# Patient Record
Sex: MALE | Race: WHITE | HISPANIC OR LATINO | Employment: FULL TIME | URBAN - METROPOLITAN AREA
[De-identification: names, ages, dates, MRNs, and addresses within clinical notes are randomized per-mention and may not be internally consistent; named-entity substitution may affect disease eponyms.]

---

## 2023-12-26 ENCOUNTER — ANESTHESIA EVENT (OUTPATIENT)
Dept: SURGERY | Facility: MEDICAL CENTER | Age: 53
DRG: 853 | End: 2023-12-26

## 2023-12-26 ENCOUNTER — ANESTHESIA (OUTPATIENT)
Dept: SURGERY | Facility: MEDICAL CENTER | Age: 53
DRG: 853 | End: 2023-12-26

## 2023-12-26 ENCOUNTER — APPOINTMENT (OUTPATIENT)
Dept: RADIOLOGY | Facility: MEDICAL CENTER | Age: 53
DRG: 853 | End: 2023-12-26
Attending: INTERNAL MEDICINE

## 2023-12-26 ENCOUNTER — HOSPITAL ENCOUNTER (INPATIENT)
Facility: MEDICAL CENTER | Age: 53
LOS: 4 days | DRG: 853 | End: 2023-12-30
Attending: STUDENT IN AN ORGANIZED HEALTH CARE EDUCATION/TRAINING PROGRAM | Admitting: STUDENT IN AN ORGANIZED HEALTH CARE EDUCATION/TRAINING PROGRAM

## 2023-12-26 DIAGNOSIS — K83.09 CHOLANGITIS: ICD-10-CM

## 2023-12-26 DIAGNOSIS — K85.10 ACUTE BILIARY PANCREATITIS WITHOUT INFECTION OR NECROSIS: ICD-10-CM

## 2023-12-26 DIAGNOSIS — K80.43 CHOLEDOCHOLITHIASIS WITH ACUTE CHOLECYSTITIS WITH OBSTRUCTION: ICD-10-CM

## 2023-12-26 PROBLEM — D72.828 NEUTROPHILIC LEUKOCYTOSIS: Status: ACTIVE | Noted: 2023-12-26

## 2023-12-26 PROBLEM — E87.6 HYPOKALEMIA: Status: ACTIVE | Noted: 2023-12-26

## 2023-12-26 PROBLEM — R65.20 SEPSIS WITH ACUTE LIVER FAILURE WITHOUT HEPATIC COMA OR SEPTIC SHOCK (HCC): Status: ACTIVE | Noted: 2023-12-26

## 2023-12-26 PROBLEM — E66.811 CLASS 1 OBESITY DUE TO EXCESS CALORIES WITH SERIOUS COMORBIDITY AND BODY MASS INDEX (BMI) OF 31.0 TO 31.9 IN ADULT: Status: ACTIVE | Noted: 2023-12-26

## 2023-12-26 PROBLEM — E66.09 CLASS 1 OBESITY DUE TO EXCESS CALORIES WITH SERIOUS COMORBIDITY AND BODY MASS INDEX (BMI) OF 31.0 TO 31.9 IN ADULT: Status: ACTIVE | Noted: 2023-12-26

## 2023-12-26 PROBLEM — R79.89 ELEVATED LFTS: Status: ACTIVE | Noted: 2023-12-26

## 2023-12-26 PROBLEM — A41.9 SEPSIS (HCC): Status: ACTIVE | Noted: 2023-12-26

## 2023-12-26 PROBLEM — D72.9 NEUTROPHILIC LEUKOCYTOSIS: Status: ACTIVE | Noted: 2023-12-26

## 2023-12-26 PROBLEM — K72.00 SEPSIS WITH ACUTE LIVER FAILURE WITHOUT HEPATIC COMA OR SEPTIC SHOCK (HCC): Status: ACTIVE | Noted: 2023-12-26

## 2023-12-26 LAB
ALBUMIN SERPL BCP-MCNC: 2.8 G/DL (ref 3.2–4.9)
ALBUMIN/GLOB SERPL: 0.7 G/DL
ALP SERPL-CCNC: 466 U/L (ref 30–99)
ALT SERPL-CCNC: 142 U/L (ref 2–50)
ANION GAP SERPL CALC-SCNC: 12 MMOL/L (ref 7–16)
AST SERPL-CCNC: 87 U/L (ref 12–45)
BASOPHILS # BLD AUTO: 0.3 % (ref 0–1.8)
BASOPHILS # BLD: 0.08 K/UL (ref 0–0.12)
BILIRUB SERPL-MCNC: 6.1 MG/DL (ref 0.1–1.5)
BUN SERPL-MCNC: 12 MG/DL (ref 8–22)
CALCIUM ALBUM COR SERPL-MCNC: 9 MG/DL (ref 8.5–10.5)
CALCIUM SERPL-MCNC: 8 MG/DL (ref 8.5–10.5)
CFT BLD TEG: 6.8 MIN (ref 4.6–9.1)
CFT P HPASE BLD TEG: 6.8 MIN (ref 4.3–8.3)
CHLORIDE SERPL-SCNC: 103 MMOL/L (ref 96–112)
CLOT ANGLE BLD TEG: 80.8 DEGREES (ref 63–78)
CO2 SERPL-SCNC: 20 MMOL/L (ref 20–33)
CREAT SERPL-MCNC: 0.63 MG/DL (ref 0.5–1.4)
CT.EXTRINSIC BLD ROTEM: 0.8 MIN (ref 0.8–2.1)
EOSINOPHIL # BLD AUTO: 0.03 K/UL (ref 0–0.51)
EOSINOPHIL NFR BLD: 0.1 % (ref 0–6.9)
ERYTHROCYTE [DISTWIDTH] IN BLOOD BY AUTOMATED COUNT: 43.8 FL (ref 35.9–50)
GFR SERPLBLD CREATININE-BSD FMLA CKD-EPI: 114 ML/MIN/1.73 M 2
GLOBULIN SER CALC-MCNC: 3.9 G/DL (ref 1.9–3.5)
GLUCOSE SERPL-MCNC: 113 MG/DL (ref 65–99)
HCT VFR BLD AUTO: 35 % (ref 42–52)
HGB BLD-MCNC: 11.5 G/DL (ref 14–18)
IMM GRANULOCYTES # BLD AUTO: 0.2 K/UL (ref 0–0.11)
IMM GRANULOCYTES NFR BLD AUTO: 0.7 % (ref 0–0.9)
INR PPP: 1.2 (ref 0.87–1.13)
LACTATE SERPL-SCNC: 0.8 MMOL/L (ref 0.5–2)
LYMPHOCYTES # BLD AUTO: 1.7 K/UL (ref 1–4.8)
LYMPHOCYTES NFR BLD: 6.3 % (ref 22–41)
MAGNESIUM SERPL-MCNC: 1.9 MG/DL (ref 1.5–2.5)
MCF BLD TEG: 69 MM (ref 52–69)
MCF.PLATELET INHIB BLD ROTEM: 51.3 MM (ref 15–32)
MCH RBC QN AUTO: 28 PG (ref 27–33)
MCHC RBC AUTO-ENTMCNC: 32.9 G/DL (ref 32.3–36.5)
MCV RBC AUTO: 85.4 FL (ref 81.4–97.8)
MONOCYTES # BLD AUTO: 1.97 K/UL (ref 0–0.85)
MONOCYTES NFR BLD AUTO: 7.3 % (ref 0–13.4)
NEUTROPHILS # BLD AUTO: 23.03 K/UL (ref 1.82–7.42)
NEUTROPHILS NFR BLD: 85.3 % (ref 44–72)
NRBC # BLD AUTO: 0 K/UL
NRBC BLD-RTO: 0 /100 WBC (ref 0–0.2)
PA AA BLD-ACNC: ABNORMAL % (ref 0–11)
PA ADP BLD-ACNC: ABNORMAL % (ref 0–17)
PLATELET # BLD AUTO: 316 K/UL (ref 164–446)
PMV BLD AUTO: 9.5 FL (ref 9–12.9)
POTASSIUM SERPL-SCNC: 3.4 MMOL/L (ref 3.6–5.5)
PROCALCITONIN SERPL-MCNC: 4.2 NG/ML
PROT SERPL-MCNC: 6.7 G/DL (ref 6–8.2)
PROTHROMBIN TIME: 15.4 SEC (ref 12–14.6)
RBC # BLD AUTO: 4.1 M/UL (ref 4.7–6.1)
SODIUM SERPL-SCNC: 135 MMOL/L (ref 135–145)
TEG ALGORITHM TGALG: ABNORMAL
WBC # BLD AUTO: 27 K/UL (ref 4.8–10.8)

## 2023-12-26 PROCEDURE — 700101 HCHG RX REV CODE 250: Performed by: ANESTHESIOLOGY

## 2023-12-26 PROCEDURE — 160029 HCHG SURGERY MINUTES - 1ST 30 MINS LEVEL 4: Performed by: INTERNAL MEDICINE

## 2023-12-26 PROCEDURE — 85025 COMPLETE CBC W/AUTO DIFF WBC: CPT

## 2023-12-26 PROCEDURE — 700105 HCHG RX REV CODE 258: Performed by: STUDENT IN AN ORGANIZED HEALTH CARE EDUCATION/TRAINING PROGRAM

## 2023-12-26 PROCEDURE — 84145 PROCALCITONIN (PCT): CPT

## 2023-12-26 PROCEDURE — 74328 X-RAY BILE DUCT ENDOSCOPY: CPT

## 2023-12-26 PROCEDURE — 160048 HCHG OR STATISTICAL LEVEL 1-5: Performed by: INTERNAL MEDICINE

## 2023-12-26 PROCEDURE — 700111 HCHG RX REV CODE 636 W/ 250 OVERRIDE (IP): Mod: JZ | Performed by: STUDENT IN AN ORGANIZED HEALTH CARE EDUCATION/TRAINING PROGRAM

## 2023-12-26 PROCEDURE — 83605 ASSAY OF LACTIC ACID: CPT

## 2023-12-26 PROCEDURE — 43274 ERCP DUCT STENT PLACEMENT: CPT | Performed by: INTERNAL MEDICINE

## 2023-12-26 PROCEDURE — 85610 PROTHROMBIN TIME: CPT

## 2023-12-26 PROCEDURE — 85347 COAGULATION TIME ACTIVATED: CPT

## 2023-12-26 PROCEDURE — 700102 HCHG RX REV CODE 250 W/ 637 OVERRIDE(OP): Performed by: STUDENT IN AN ORGANIZED HEALTH CARE EDUCATION/TRAINING PROGRAM

## 2023-12-26 PROCEDURE — C2617 STENT, NON-COR, TEM W/O DEL: HCPCS | Performed by: INTERNAL MEDICINE

## 2023-12-26 PROCEDURE — 99255 IP/OBS CONSLTJ NEW/EST HI 80: CPT | Mod: 25 | Performed by: INTERNAL MEDICINE

## 2023-12-26 PROCEDURE — A9270 NON-COVERED ITEM OR SERVICE: HCPCS | Mod: JZ

## 2023-12-26 PROCEDURE — 770020 HCHG ROOM/CARE - TELE (206)

## 2023-12-26 PROCEDURE — 99223 1ST HOSP IP/OBS HIGH 75: CPT | Mod: AI | Performed by: STUDENT IN AN ORGANIZED HEALTH CARE EDUCATION/TRAINING PROGRAM

## 2023-12-26 PROCEDURE — 700111 HCHG RX REV CODE 636 W/ 250 OVERRIDE (IP): Performed by: ANESTHESIOLOGY

## 2023-12-26 PROCEDURE — C1769 GUIDE WIRE: HCPCS | Performed by: INTERNAL MEDICINE

## 2023-12-26 PROCEDURE — 0F798DZ DILATION OF COMMON BILE DUCT WITH INTRALUMINAL DEVICE, VIA NATURAL OR ARTIFICIAL OPENING ENDOSCOPIC: ICD-10-PCS | Performed by: INTERNAL MEDICINE

## 2023-12-26 PROCEDURE — 43264 ERCP REMOVE DUCT CALCULI: CPT | Performed by: INTERNAL MEDICINE

## 2023-12-26 PROCEDURE — 700117 HCHG RX CONTRAST REV CODE 255: Performed by: INTERNAL MEDICINE

## 2023-12-26 PROCEDURE — 85576 BLOOD PLATELET AGGREGATION: CPT

## 2023-12-26 PROCEDURE — A9270 NON-COVERED ITEM OR SERVICE: HCPCS | Performed by: STUDENT IN AN ORGANIZED HEALTH CARE EDUCATION/TRAINING PROGRAM

## 2023-12-26 PROCEDURE — 160035 HCHG PACU - 1ST 60 MINS PHASE I: Performed by: INTERNAL MEDICINE

## 2023-12-26 PROCEDURE — 700102 HCHG RX REV CODE 250 W/ 637 OVERRIDE(OP): Mod: JZ

## 2023-12-26 PROCEDURE — 85384 FIBRINOGEN ACTIVITY: CPT | Mod: 91

## 2023-12-26 PROCEDURE — 0FC98ZZ EXTIRPATION OF MATTER FROM COMMON BILE DUCT, VIA NATURAL OR ARTIFICIAL OPENING ENDOSCOPIC: ICD-10-PCS | Performed by: INTERNAL MEDICINE

## 2023-12-26 PROCEDURE — 80053 COMPREHEN METABOLIC PANEL: CPT

## 2023-12-26 PROCEDURE — 160009 HCHG ANES TIME/MIN: Performed by: INTERNAL MEDICINE

## 2023-12-26 PROCEDURE — 36415 COLL VENOUS BLD VENIPUNCTURE: CPT

## 2023-12-26 PROCEDURE — 83735 ASSAY OF MAGNESIUM: CPT

## 2023-12-26 PROCEDURE — 160002 HCHG RECOVERY MINUTES (STAT): Performed by: INTERNAL MEDICINE

## 2023-12-26 DEVICE — ADVANIX BILIARY CENTER BEND 10X5: Type: IMPLANTABLE DEVICE | Site: BILE DUCT | Status: FUNCTIONAL

## 2023-12-26 RX ORDER — DEXAMETHASONE SODIUM PHOSPHATE 4 MG/ML
INJECTION, SOLUTION INTRA-ARTICULAR; INTRALESIONAL; INTRAMUSCULAR; INTRAVENOUS; SOFT TISSUE PRN
Status: DISCONTINUED | OUTPATIENT
Start: 2023-12-26 | End: 2023-12-26 | Stop reason: SURG

## 2023-12-26 RX ORDER — MORPHINE SULFATE 4 MG/ML
2 INJECTION INTRAVENOUS EVERY 4 HOURS PRN
Status: DISCONTINUED | OUTPATIENT
Start: 2023-12-26 | End: 2023-12-28

## 2023-12-26 RX ORDER — LABETALOL HYDROCHLORIDE 5 MG/ML
10 INJECTION, SOLUTION INTRAVENOUS EVERY 4 HOURS PRN
Status: DISCONTINUED | OUTPATIENT
Start: 2023-12-26 | End: 2023-12-30 | Stop reason: HOSPADM

## 2023-12-26 RX ORDER — LIDOCAINE HYDROCHLORIDE 20 MG/ML
INJECTION, SOLUTION EPIDURAL; INFILTRATION; INTRACAUDAL; PERINEURAL PRN
Status: DISCONTINUED | OUTPATIENT
Start: 2023-12-26 | End: 2023-12-26 | Stop reason: SURG

## 2023-12-26 RX ORDER — POTASSIUM CHLORIDE 20 MEQ/1
40 TABLET, EXTENDED RELEASE ORAL ONCE
Status: COMPLETED | OUTPATIENT
Start: 2023-12-26 | End: 2023-12-26

## 2023-12-26 RX ORDER — ROCURONIUM BROMIDE 10 MG/ML
INJECTION, SOLUTION INTRAVENOUS PRN
Status: DISCONTINUED | OUTPATIENT
Start: 2023-12-26 | End: 2023-12-26 | Stop reason: SURG

## 2023-12-26 RX ORDER — OXYCODONE HCL 5 MG/5 ML
10 SOLUTION, ORAL ORAL
Status: DISCONTINUED | OUTPATIENT
Start: 2023-12-26 | End: 2023-12-26 | Stop reason: HOSPADM

## 2023-12-26 RX ORDER — ONDANSETRON 2 MG/ML
4 INJECTION INTRAMUSCULAR; INTRAVENOUS
Status: DISCONTINUED | OUTPATIENT
Start: 2023-12-26 | End: 2023-12-26 | Stop reason: HOSPADM

## 2023-12-26 RX ORDER — EPHEDRINE SULFATE 50 MG/ML
5 INJECTION, SOLUTION INTRAVENOUS
Status: DISCONTINUED | OUTPATIENT
Start: 2023-12-26 | End: 2023-12-26 | Stop reason: HOSPADM

## 2023-12-26 RX ORDER — LABETALOL HYDROCHLORIDE 5 MG/ML
5 INJECTION, SOLUTION INTRAVENOUS
Status: DISCONTINUED | OUTPATIENT
Start: 2023-12-26 | End: 2023-12-26 | Stop reason: HOSPADM

## 2023-12-26 RX ORDER — TRAMADOL HYDROCHLORIDE 50 MG/1
50 TABLET ORAL EVERY 4 HOURS PRN
Status: DISCONTINUED | OUTPATIENT
Start: 2023-12-26 | End: 2023-12-28

## 2023-12-26 RX ORDER — SODIUM CHLORIDE, SODIUM LACTATE, POTASSIUM CHLORIDE, CALCIUM CHLORIDE 600; 310; 30; 20 MG/100ML; MG/100ML; MG/100ML; MG/100ML
INJECTION, SOLUTION INTRAVENOUS CONTINUOUS
Status: DISCONTINUED | OUTPATIENT
Start: 2023-12-26 | End: 2023-12-26 | Stop reason: HOSPADM

## 2023-12-26 RX ORDER — BISACODYL 10 MG
10 SUPPOSITORY, RECTAL RECTAL
Status: DISCONTINUED | OUTPATIENT
Start: 2023-12-26 | End: 2023-12-30 | Stop reason: HOSPADM

## 2023-12-26 RX ORDER — SODIUM CHLORIDE, SODIUM LACTATE, POTASSIUM CHLORIDE, CALCIUM CHLORIDE 600; 310; 30; 20 MG/100ML; MG/100ML; MG/100ML; MG/100ML
INJECTION, SOLUTION INTRAVENOUS CONTINUOUS
Status: DISCONTINUED | OUTPATIENT
Start: 2023-12-26 | End: 2023-12-28

## 2023-12-26 RX ORDER — DIPHENHYDRAMINE HYDROCHLORIDE 50 MG/ML
12.5 INJECTION INTRAMUSCULAR; INTRAVENOUS
Status: DISCONTINUED | OUTPATIENT
Start: 2023-12-26 | End: 2023-12-26 | Stop reason: HOSPADM

## 2023-12-26 RX ORDER — ONDANSETRON 2 MG/ML
INJECTION INTRAMUSCULAR; INTRAVENOUS PRN
Status: DISCONTINUED | OUTPATIENT
Start: 2023-12-26 | End: 2023-12-26 | Stop reason: SURG

## 2023-12-26 RX ORDER — AMOXICILLIN 250 MG
2 CAPSULE ORAL 2 TIMES DAILY
Status: DISCONTINUED | OUTPATIENT
Start: 2023-12-26 | End: 2023-12-30 | Stop reason: HOSPADM

## 2023-12-26 RX ORDER — HALOPERIDOL 5 MG/ML
1 INJECTION INTRAMUSCULAR
Status: DISCONTINUED | OUTPATIENT
Start: 2023-12-26 | End: 2023-12-26 | Stop reason: HOSPADM

## 2023-12-26 RX ORDER — OXYCODONE HCL 5 MG/5 ML
5 SOLUTION, ORAL ORAL
Status: DISCONTINUED | OUTPATIENT
Start: 2023-12-26 | End: 2023-12-26 | Stop reason: HOSPADM

## 2023-12-26 RX ORDER — POLYETHYLENE GLYCOL 3350 17 G/17G
1 POWDER, FOR SOLUTION ORAL
Status: DISCONTINUED | OUTPATIENT
Start: 2023-12-26 | End: 2023-12-30 | Stop reason: HOSPADM

## 2023-12-26 RX ADMIN — DEXAMETHASONE SODIUM PHOSPHATE 8 MG: 4 INJECTION INTRA-ARTICULAR; INTRALESIONAL; INTRAMUSCULAR; INTRAVENOUS; SOFT TISSUE at 11:26

## 2023-12-26 RX ADMIN — ONDANSETRON 4 MG: 2 INJECTION INTRAMUSCULAR; INTRAVENOUS at 11:26

## 2023-12-26 RX ADMIN — PIPERACILLIN AND TAZOBACTAM 3.38 G: 3; .375 INJECTION, POWDER, FOR SOLUTION INTRAVENOUS at 22:56

## 2023-12-26 RX ADMIN — SODIUM CHLORIDE, POTASSIUM CHLORIDE, SODIUM LACTATE AND CALCIUM CHLORIDE: 600; 310; 30; 20 INJECTION, SOLUTION INTRAVENOUS at 11:22

## 2023-12-26 RX ADMIN — ROCURONIUM BROMIDE 50 MG: 50 INJECTION, SOLUTION INTRAVENOUS at 11:26

## 2023-12-26 RX ADMIN — SODIUM CHLORIDE, POTASSIUM CHLORIDE, SODIUM LACTATE AND CALCIUM CHLORIDE: 600; 310; 30; 20 INJECTION, SOLUTION INTRAVENOUS at 23:52

## 2023-12-26 RX ADMIN — MORPHINE SULFATE 2 MG: 4 INJECTION, SOLUTION INTRAMUSCULAR; INTRAVENOUS at 05:11

## 2023-12-26 RX ADMIN — LIDOCAINE HYDROCHLORIDE 100 MG: 20 INJECTION, SOLUTION EPIDURAL; INFILTRATION; INTRACAUDAL at 11:26

## 2023-12-26 RX ADMIN — SODIUM CHLORIDE, POTASSIUM CHLORIDE, SODIUM LACTATE AND CALCIUM CHLORIDE: 600; 310; 30; 20 INJECTION, SOLUTION INTRAVENOUS at 05:00

## 2023-12-26 RX ADMIN — POTASSIUM CHLORIDE 40 MEQ: 1500 TABLET, EXTENDED RELEASE ORAL at 08:16

## 2023-12-26 RX ADMIN — DOCUSATE SODIUM 50 MG AND SENNOSIDES 8.6 MG 2 TABLET: 8.6; 5 TABLET, FILM COATED ORAL at 05:08

## 2023-12-26 RX ADMIN — PROPOFOL 100 MG: 10 INJECTION, EMULSION INTRAVENOUS at 11:26

## 2023-12-26 RX ADMIN — PIPERACILLIN AND TAZOBACTAM 3.38 G: 3; .375 INJECTION, POWDER, FOR SOLUTION INTRAVENOUS at 08:17

## 2023-12-26 RX ADMIN — SUGAMMADEX 200 MG: 100 INJECTION, SOLUTION INTRAVENOUS at 11:45

## 2023-12-26 RX ADMIN — MORPHINE SULFATE 2 MG: 4 INJECTION, SOLUTION INTRAMUSCULAR; INTRAVENOUS at 22:51

## 2023-12-26 ASSESSMENT — COGNITIVE AND FUNCTIONAL STATUS - GENERAL
SUGGESTED CMS G CODE MODIFIER DAILY ACTIVITY: CH
DAILY ACTIVITIY SCORE: 24
SUGGESTED CMS G CODE MODIFIER MOBILITY: CH
MOBILITY SCORE: 24

## 2023-12-26 ASSESSMENT — ENCOUNTER SYMPTOMS
HEADACHES: 0
DEPRESSION: 0
COUGH: 0
VOMITING: 0
MYALGIAS: 0
SPUTUM PRODUCTION: 0
CHILLS: 0
DOUBLE VISION: 0
FEVER: 0
TINGLING: 0
DIARRHEA: 0
BLURRED VISION: 0
ORTHOPNEA: 0
WHEEZING: 0
NAUSEA: 0
ABDOMINAL PAIN: 1
PALPITATIONS: 0
SHORTNESS OF BREATH: 0

## 2023-12-26 ASSESSMENT — LIFESTYLE VARIABLES
AVERAGE NUMBER OF DAYS PER WEEK YOU HAVE A DRINK CONTAINING ALCOHOL: 0
TOTAL SCORE: 0
HAVE PEOPLE ANNOYED YOU BY CRITICIZING YOUR DRINKING: NO
EVER FELT BAD OR GUILTY ABOUT YOUR DRINKING: NO
EVER HAD A DRINK FIRST THING IN THE MORNING TO STEADY YOUR NERVES TO GET RID OF A HANGOVER: NO
HOW MANY TIMES IN THE PAST YEAR HAVE YOU HAD 5 OR MORE DRINKS IN A DAY: 0
TOTAL SCORE: 0
ALCOHOL_USE: NO
ON A TYPICAL DAY WHEN YOU DRINK ALCOHOL HOW MANY DRINKS DO YOU HAVE: 0
DOES PATIENT WANT TO STOP DRINKING: NO
HAVE YOU EVER FELT YOU SHOULD CUT DOWN ON YOUR DRINKING: NO
TOTAL SCORE: 0
CONSUMPTION TOTAL: NEGATIVE

## 2023-12-26 ASSESSMENT — PAIN SCALES - GENERAL: PAIN_LEVEL: 2

## 2023-12-26 ASSESSMENT — PATIENT HEALTH QUESTIONNAIRE - PHQ9
SUM OF ALL RESPONSES TO PHQ9 QUESTIONS 1 AND 2: 0
2. FEELING DOWN, DEPRESSED, IRRITABLE, OR HOPELESS: NOT AT ALL
1. LITTLE INTEREST OR PLEASURE IN DOING THINGS: NOT AT ALL

## 2023-12-26 ASSESSMENT — PAIN DESCRIPTION - PAIN TYPE
TYPE: ACUTE PAIN
TYPE: ACUTE PAIN
TYPE: SURGICAL PAIN
TYPE: ACUTE PAIN
TYPE: ACUTE PAIN

## 2023-12-26 ASSESSMENT — FIBROSIS 4 INDEX: FIB4 SCORE: 1.2

## 2023-12-26 NOTE — H&P
Surgery General History & Physical Note      Date  12/26/2023    Primary Care Physician  No primary care provider on file.    CC  Choledocholithiasis    HPI  This is a 53 y.o. male who presented with obstructive jaundice.  He apparently was transferred from [facility].  He is currently postoperative ERCP.  He was found to have choledocholithiasis without evidence described of cholangitis.  His bilirubin was elevated along with his liver function test.  His white count is 27,000.  Patient is unable to provide any historical information.  Patient he does not demonstrate an address.  He allegedly was admitted there yesterday and transferred here laboratories demonstrated a marked elevation of his lipase which was not repeated here.  GI was consulted.  Pro time and thromboelastogram were not obtained.  Patient underwent ERCP based on radiologic imaging and stones were recovered.  Patient has a cystic lesion in the left lobe of his liver by CT scan.  I do not see any enhancement or internal complexity and this may be benign.  His gallbladder showed a thickened gallbladder wall with edema and stones.  Gallbladder did not visualize on cholangiogram and ERCP I was consulted by [employer] messaging to consider cholecystectomy.  The patient allegedly consumes alcohol but not aggressively.  There is no ascites on CT scan.    History reviewed. No pertinent past medical history.    History reviewed. No pertinent surgical history.    Current Facility-Administered Medications   Medication Dose Route Frequency Provider Last Rate Last Admin    [MAR Hold] senna-docusate (Pericolace Or Senokot S) 8.6-50 MG per tablet 2 Tablet  2 Tablet Oral BID Raffy Sheldon M.D.   2 Tablet at 12/26/23 0508    And    [MAR Hold] polyethylene glycol/lytes (Miralax) Packet 1 Packet  1 Packet Oral QDAY RANJANAN Raffy Sheldon M.D.        And    [MAR Hold] magnesium hydroxide (Milk Of Magnesia) suspension 30 mL  30 mL Oral QDAY PRN Raffy HODGES  ORA Sheldon        And    [MAR Hold] bisacodyl (Dulcolax) suppository 10 mg  10 mg Rectal QDAY PRN Raffy Sheldon M.D.        [MAR Hold] piperacillin-tazobactam (Zosyn) 3.375 g in  mL IVPB  3.375 g Intravenous Q8HRS Raffy Sheldon M.D. 25 mL/hr at 12/26/23 0817 3.375 g at 12/26/23 0817    lactated ringers infusion   Intravenous Continuous Raffy Sheldon M.D. 175 mL/hr at 12/26/23 0504 New Bag at 12/26/23 1122    [MAR Hold] labetalol (Normodyne/Trandate) injection 10 mg  10 mg Intravenous Q4HRS PRN Raffy Sheldon M.D.        [MAR Hold] traMADol (Ultram) 50 MG tablet 50 mg  50 mg Oral Q4HRS PRN Raffy Sheldon M.D.        [MAR Hold] morphine 4 MG/ML injection 2 mg  2 mg Intravenous Q4HRS PRN Raffy Sheldon M.D.   2 mg at 12/26/23 0511    ondansetron (Zofran) syringe/vial injection 4 mg  4 mg Intravenous Once PRN Lizbeth Leahy M.D.        haloperidol lactate (Haldol) injection 1 mg  1 mg Intravenous Q15 MIN PRN Lizbeth Leahy M.D.        diphenhydrAMINE (Benadryl) injection 12.5 mg  12.5 mg Intravenous Q15 MIN PRN Lizbeth Leahy M.D.        lactated ringers infusion   Intravenous Continuous Lizbeth Leahy M.D.        fentaNYL (Sublimaze) injection 25 mcg  25 mcg Intravenous Q2 MIN PRN Lizbeth Leahy M.D.        Or    fentaNYL (Sublimaze) injection 50 mcg  50 mcg Intravenous Q2 MIN PRN Lizbeth Leahy M.D.        oxyCODONE (Roxicodone) oral solution 5 mg  5 mg Oral Once PRN Lizbeth Leahy M.D.        Or    oxyCODONE (Roxicodone) oral solution 10 mg  10 mg Oral Once PRN Lizbeth Leahy M.D.        ePHEDrine injection 5 mg  5 mg Intravenous Q5 MIN PRN Lizbeth Leahy M.D.        labetalol (Normodyne/Trandate) injection 5 mg  5 mg Intravenous Q5 MIN PRN Lizbeth Leahy M.D.        albuterol (Proventil) 2.5mg/0.5ml nebulizer solution 2.5 mg  2.5 mg Inhalation Q10 MIN PRN Lizbeth Leahy M.D.        IOHEXOL 300 MG/ML INJ SOLN                Social History     Socioeconomic History     Marital status: Not on file     Spouse name: Not on file    Number of children: Not on file    Years of education: Not on file    Highest education level: Not on file   Occupational History    Not on file   Tobacco Use    Smoking status: Never     Passive exposure: Never    Smokeless tobacco: Never   Vaping Use    Vaping Use: Never used   Substance and Sexual Activity    Alcohol use: Not Currently     Comment: occasionally    Drug use: Never    Sexual activity: Not on file   Other Topics Concern    Not on file   Social History Narrative    Not on file     Social Determinants of Health     Financial Resource Strain: Not on file   Food Insecurity: Not on file   Transportation Needs: Not on file   Physical Activity: Not on file   Stress: Not on file   Social Connections: Not on file   Intimate Partner Violence: Not on file   Housing Stability: Not on file       History reviewed. No pertinent family history.    Allergies  Patient has no known allergies.    Review of Systems  Currently unobtainable    Physical Exam  Obese  male stated age  Clinically jaundiced  Abdomen is distended and tender.  Cardiac and pulmonary examinations are normal  Acutely the patient has been intact  Vital Signs  Blood Pressure: 112/66   Temperature: 36.1 °C (97 °F)   Pulse: 86   Respiration: 16   Pulse Oximetry: 94 %       Labs:  Recent Labs     12/26/23 0427   WBC 27.0*   RBC 4.10*   HEMOGLOBIN 11.5*   HEMATOCRIT 35.0*   MCV 85.4   MCH 28.0   MCHC 32.9   RDW 43.8   PLATELETCT 316   MPV 9.5     Recent Labs     12/26/23 0427   SODIUM 135   POTASSIUM 3.4*   CHLORIDE 103   CO2 20   GLUCOSE 113*   BUN 12   CREATININE 0.63   CALCIUM 8.0*         Recent Labs     12/26/23 0427   ASTSGOT 87*   ALTSGPT 142*   TBILIRUBIN 6.1*   ALKPHOSPHAT 466*   GLOBULIN 3.9*       Radiology:  [id number] DUCTS   Final Result      Digitized intraoperative radiograph is submitted for review.  This examination is not for diagnostic purpose but for guidance  during a surgical procedure. Please see the patient's chart for full procedural details.      OUTSIDE IMAGES-CT ABDOMEN /PELVIS   Final Result            Assessment/Plan:  Patient has evidence of acute cholecystitis.  His gallbladder was nonvisualized.  He does have a white count of 27,000.  I suspect he has an empyema of the gallbladder.  He does have stones.  He did have common bile duct stones and significant evidence of gallstone pancreatitis.  He has undergone ERCP with sphincterotomy and stone evacuation.  He will require cholecystectomy.  I have ordered coagulation studies including a thromboelastogram with platelet mapping and a pro time  Antibiotic therapy is appropriate at this time.  Provided there is no clinical deterioration or complications from ERCP the patient may be a candidate for cholecystectomy as long as his pancreatitis does not worsen in the next 24 to 48 hours.  His gallbladder looks pretty bad anatomically so there probably is a significant risk of having to have cholecystectomy through open incision  Risks of surgery may be elevated.  Additional data is being correlated patient is not having surgery today

## 2023-12-26 NOTE — CARE PLAN
The patient is Watcher - Medium risk of patient condition declining or worsening    Shift Goals  Clinical Goals: pain management    Patient arrived late in shift. Oriented to room and call system. Admission completed.     Progress made toward(s) clinical / shift goals:      Patient is not progressing towards the following goals:      Problem: Knowledge Deficit - Standard  Goal: Patient and family/care givers will demonstrate understanding of plan of care, disease process/condition, diagnostic tests and medications  Outcome: Not Progressing     Problem: Pain - Standard  Goal: Alleviation of pain or a reduction in pain to the patient’s comfort goal  Outcome: Not Progressing     Problem: Psychosocial  Goal: Patient's level of anxiety will decrease  Outcome: Not Progressing  Goal: Patient's ability to verbalize feelings about condition will improve  Outcome: Not Progressing  Goal: Patient's ability to re-evaluate and adapt role responsibilities will improve  Outcome: Not Progressing  Goal: Patient and family will demonstrate ability to cope with life altering diagnosis and/or procedure  Outcome: Not Progressing  Goal: Spiritual and cultural needs incorporated into hospitalization  Outcome: Not Progressing     Problem: Communication  Goal: The ability to communicate needs accurately and effectively will improve  Outcome: Not Progressing     Problem: Hemodynamics  Goal: Patient's hemodynamics, fluid balance and neurologic status will be stable or improve  Outcome: Not Progressing     Problem: Respiratory  Goal: Patient will achieve/maintain optimum respiratory ventilation and gas exchange  Outcome: Not Progressing     Problem: Nutrition  Goal: Patient's nutritional and fluid intake will be adequate or improve  Outcome: Not Progressing  Goal: Enteral nutrition will be maintained or improve  Outcome: Not Progressing  Goal: Enteral nutrition will be maintained or improve  Outcome: Not Progressing     Problem: Mobility  Goal:  Patient's capacity to carry out activities will improve  Outcome: Not Progressing

## 2023-12-26 NOTE — ASSESSMENT & PLAN NOTE
This is Sepsis Present on admission  SIRS criteria identified on my evaluation include:   Fever, with temperature greater than 100.9 deg F, Tachycardia, with heart rate greater than 90 BPM, and Leukocytosis, with WBC greater than 12,000  Clinical indicators of end organ dysfunction include Acute Liver Failure, with bilirubin greater than 2  Source is cholangitis  Sepsis protocol initiated  Crystalloid Fluid Administration: At outside facility  IV antibiotics as appropriate for source of sepsis  Reassessment: I have reassessed the patient's hemodynamic status  Blood cultures drawn at outside facility, did not follow  Started on Zosyn 12/26/23

## 2023-12-26 NOTE — ANESTHESIA TIME REPORT
Anesthesia Start and Stop Event Times       Date Time Event    12/26/2023 1005 Ready for Procedure     1122 Anesthesia Start     1152 Anesthesia Stop          Responsible Staff  12/26/23      Name Role Begin End    Lizbeth Leahy M.D. Anesth 1122 1152          Overtime Reason:  no overtime (within assigned shift)    Comments:

## 2023-12-26 NOTE — ASSESSMENT & PLAN NOTE
On admission, GI was consulted patient underwent ERCP with biliary sphincterotomy, calculi removal, biliary stent insertion 12/26.      Case discussed with GI, stent was removed during surgery.  X-ray imaging confirmed removal.

## 2023-12-26 NOTE — H&P
Hospital Medicine History & Physical Note    Date of Service  12/26/2023    Primary Care Physician  No primary care provider on file.    Code Status  Full Code    Chief Complaint  Abdominal pain    History of Presenting Illness  Sharath Gupta is a 53 y.o. male who transferred from outside facility 12/26/2023 with cholangitis.  No significant PMH and is not on any medication.  Patient has been having intermittent upper abdominal pain for the past 2 months, pain has been getting more severe so he presented to ED.  Denied fever/chills.  Vitals on presentation febrile with a temp of 38.9, BP 98/66, tachycardic.   Labs there showed a WBC count of 14.6, lipase of more than 6000, , , alk phos 608, T. bili 6.7, potassium 3.6.  Transferred for GI evaluation.     I discussed the plan of care with patient, family, and bedside RN.    Review of Systems  Review of Systems   Constitutional:  Negative for chills and fever.   Respiratory:  Negative for cough and shortness of breath.    Cardiovascular:  Negative for chest pain.   Gastrointestinal:  Positive for abdominal pain. Negative for diarrhea, nausea and vomiting.   Genitourinary:  Negative for dysuria and urgency.     Past Medical History  No significant PMH    Surgical History   has no past surgical history on file.     Family History  Family history reviewed with patient. There is no family history that is pertinent to the chief complaint.     Social History   reports that he has never smoked. He has never been exposed to tobacco smoke. He has never used smokeless tobacco. He reports that he does not currently use alcohol. He reports that he does not use drugs.    Allergies  No Known Allergies    Medications  None       Physical Exam  Temp:  [36.8 °C (98.2 °F)] 36.8 °C (98.2 °F)  Pulse:  [77] 77  Resp:  [18] 18  BP: (107)/(67) 107/67  SpO2:  [93 %] 93 %  Blood Pressure: 107/67   Temperature: 36.8 °C (98.2 °F)   Pulse: 77   Respiration: 18   Pulse Oximetry: 93  "%       Physical Exam  Constitutional:       Appearance: Normal appearance.   HENT:      Head: Normocephalic and atraumatic.      Mouth/Throat:      Mouth: Mucous membranes are moist.      Pharynx: Oropharynx is clear. No oropharyngeal exudate or posterior oropharyngeal erythema.   Eyes:      General: No scleral icterus.  Cardiovascular:      Rate and Rhythm: Normal rate and regular rhythm.      Pulses: Normal pulses.      Heart sounds: Normal heart sounds. No murmur heard.  Pulmonary:      Effort: Pulmonary effort is normal. No respiratory distress.      Breath sounds: Normal breath sounds. No wheezing.   Abdominal:      Palpations: Abdomen is soft.      Tenderness: There is no abdominal tenderness.   Musculoskeletal:         General: No swelling or tenderness. Normal range of motion.      Cervical back: Normal range of motion.   Skin:     General: Skin is warm and dry.      Coloration: Skin is jaundiced.   Neurological:      General: No focal deficit present.      Mental Status: He is alert and oriented to person, place, and time. Mental status is at baseline.   Psychiatric:         Mood and Affect: Mood normal.         Laboratory:  Recent Labs     12/26/23  0427   WBC 27.0*   RBC 4.10*   HEMOGLOBIN 11.5*   HEMATOCRIT 35.0*   MCV 85.4   MCH 28.0   MCHC 32.9   RDW 43.8   PLATELETCT 316   MPV 9.5         No results for input(s): \"ALTSGPT\", \"ASTSGOT\", \"ALKPHOSPHAT\", \"TBILIRUBIN\", \"DBILIRUBIN\", \"GAMMAGT\", \"AMYLASE\", \"LIPASE\", \"ALB\", \"PREALBUMIN\", \"GLUCOSE\" in the last 72 hours.      No results for input(s): \"NTPROBNP\" in the last 72 hours.      No results for input(s): \"TROPONINT\" in the last 72 hours.    Imaging:  OUTSIDE IMAGES-CT ABDOMEN /PELVIS   Final Result        Assessment/Plan:  Justification for Admission Status  I anticipate this patient will require at least two midnights for appropriate medical management, necessitating inpatient admission because choledocholithiasis    * Sepsis with acute liver failure " without hepatic coma or septic shock (HCC)- (present on admission)  Assessment & Plan  This is Sepsis Present on admission  SIRS criteria identified on my evaluation include:   Fever, with temperature greater than 100.9 deg F, Tachycardia, with heart rate greater than 90 BPM, and Leukocytosis, with WBC greater than 12,000  Clinical indicators of end organ dysfunction include Acute Liver Failure, with bilirubin greater than 2  Source is cholangitis  Sepsis protocol initiated  Crystalloid Fluid Administration: At outside facility  IV antibiotics as appropriate for source of sepsis  Reassessment: I have reassessed the patient's hemodynamic status  Blood cultures drawn at outside facility, did not follow  Started on Zosyn    Acute biliary pancreatitis without infection or necrosis- (present on admission)  Assessment & Plan  Gallstone pancreatitis. GI consult in the morning  IV fluid.  Patient is in significant amount of pain requiring narcotic pain management, close hemodynamic and respiratory status monitoring    Cholangitis- (present on admission)  Assessment & Plan  Elevated LFTs and T. bili as mentioned in HPI but outside facility  Resulting in sepsis, see above.  GI consult in the morning for ERCP    Class 1 obesity due to excess calories with serious comorbidity and body mass index (BMI) of 31.0 to 31.9 in adult- (present on admission)  Assessment & Plan  BMI 31.84, patient follow-up        VTE prophylaxis: SCDs/TEDs

## 2023-12-26 NOTE — ANESTHESIA POSTPROCEDURE EVALUATION
Patient: Sharaht Gupta    Procedure Summary       Date: 12/26/23 Room / Location: [facility] ROOM 26 / SURGERY SAME DAY [facility]    Anesthesia Start: 1122 Anesthesia Stop:     Procedures:       ERCP (ENDOSCOPIC RETROGRADE CHOLANGIOPANCREATOGRAPHY) (Esophagus)      SPHINCTEROTOMY (Abdomen)      DILATION, STRICTURE, BILE DUCT (Abdomen)      ERCP, WITH CALCULUS REMOVAL FROM BILE OR PANCREATIC DUCT (Abdomen)      INSERTION, STENT, BILE DUCT (Abdomen) Diagnosis: (cholelocholithiasis)    Surgeons: Darwin Puente M.D. Responsible Provider: Lizbeth Leahy M.D.    Anesthesia Type: general ASA Status: 3            Final Anesthesia Type: general  Last vitals  BP   Blood Pressure: 128/70    Temp   36.4 °C (97.6 °F)    Pulse   91   Resp   18    SpO2   95 %      Anesthesia Post Evaluation    Patient location during evaluation: PACU  Patient participation: complete - patient participated  Level of consciousness: awake and alert  Pain score: 2    Airway patency: patent  Anesthetic complications: no  Cardiovascular status: adequate and hemodynamically stable  Respiratory status: acceptable  Hydration status: acceptable    PONV: none          No notable events documented.     Nurse Pain Score: 5 (NPRS)

## 2023-12-26 NOTE — OP REPORT
OPERATIVE REPORT    PATIENT:   Sharath Gupta   1970       PREOPERATIVE DIAGNOSES/INDICATIONS: Obstructive jaundice, cholangitis    PROCEDURE: ERCP with biliary sphincterotomy, calculi removal, biliary stent insertion    PHYSICIAN:  Darwin Puente MD     ANESTHESIA:  Per anesthesiologist.  General endotracheal anesthesia    LOCATION: [facility]    CONSENT: The risks, benefits and alternatives of the procedure were discussed in detail. The risks include and are not limited to bleeding, infection, perforation, missed lesions, and sedations risks (cardiopulmonary compromise and allergic reaction to medications).    DESCRIPTION:   The patient presented to the operating room.  A time out was performed prior to beginning the procedure.   The patient was placed in the supine resting position. Patient was sedated by anesthesia: GETA.    OPERATIVE FINDINGS:    Endoscope advanced under oblique visualization to the second portion of the duodenum.  Ampulla located within a duodenal diverticulum.  The common bile duct was cannulated.  A 8 mm biliary sphincterotomy pursued in standard fashion.  No bleeding.  Cholangiogram consistent with acute cholecystitis (gallbladder failed to opacify).  CBD diameter proximately 10 mm.  Multiple balloon sweeps from the bifurcation revealed several small stones which conglomerated into a large sludge ball.  No residual stones at conclusion.  A 10 Moroccan by 5 cm plastic biliary stent deployed 5 cm into the bile duct.      Blood loss: None    The patient tolerated the procedure well.      There were no immediate complications.    Pathology samples obtained: None    IMPRESSION:  Periampullary diverticulum with intra-diverticular papilla  Choledocholithiasis  Acute cholecystitis  Stones successfully extracted after biliary sphincterotomy.  10 Moroccan by 5 cm plastic biliary stent deployed 5 cm into the bile duct.    RECOMMENDATIONS:  Watch for complications  Surgical consultation for  consideration of cholecystectomy  Consult interventional radiology for consideration of fluid drainage of left lobe of liver  Clear liquid diet  Stent removal by ERCP in 4 to 6 weeks

## 2023-12-26 NOTE — ASSESSMENT & PLAN NOTE
BMI 31.84, patient was counselled on healthy diet and lifestyle and risk of cardiovascular diseases due to obesity.

## 2023-12-26 NOTE — CONSULTS
"Date of Consultation:  12/26/2023    Patient: : Sharath Gupta  MRN: [id number]    Referring Physician:  Dr Orlando     GI:Darwin Puente M.D.     Reason for Consultation: Ascending cholangitis, abnormal CT scan    History of Present Illness:   53-year-old male transferred for presumed ascending cholangitis.  Patient having right upper quadrant abdominal pain.  No significant past medical history.  Presents to outside facility.  He was tachycardic and had a white count of 14,000.  He had a lipase of 6000 and an obstructive biochemical liver test pattern.  He underwent cross-sectional imaging.  He was transferred here.  He is on Zosyn.  His fevers have defervesced.  He is feeling somewhat better but still having right upper quadrant pain.  He is NPO.      No past medical history on file.    History reviewed. No pertinent surgical history.    History reviewed. No pertinent family history.    Social History     Tobacco Use    Smoking status: Never     Passive exposure: Never    Smokeless tobacco: Never   Vaping Use    Vaping Use: Never used   Substance and Sexual Activity    Alcohol use: Not Currently    Drug use: Never       Current Meds (name, sig, last dose):     Current Facility-Administered Medications:     senna-docusate **AND** polyethylene glycol/lytes **AND** magnesium hydroxide **AND** bisacodyl    piperacillin-tazobactam    LR    labetalol    traMADol    morphine injection    potassium chloride SA      ROS  10 systems reviewed and are negative unless otherwise noted in history of present illness.    Physical Exam:  Vitals:    12/26/23 0242 12/26/23 0511 12/26/23 0744   BP: 107/67  110/70   Pulse: 77  85   Resp: 18  18   Temp: 36.8 °C (98.2 °F)  36.1 °C (96.9 °F)   TempSrc: Temporal  Temporal   SpO2: 93%  96%   Weight: 86.8 kg (191 lb 5.8 oz) 86.8 kg (191 lb 5.8 oz)    Height: 1.651 m (5' 5\")         Physical Exam  Vitals and nursing note reviewed.   Constitutional:       General: He is not in acute distress.     " Appearance: He is not ill-appearing or toxic-appearing.   HENT:      Head: Normocephalic and atraumatic.   Eyes:      General: Scleral icterus present.   Cardiovascular:      Rate and Rhythm: Normal rate and regular rhythm.      Pulses: Normal pulses.      Heart sounds: Normal heart sounds.   Pulmonary:      Effort: Pulmonary effort is normal.      Breath sounds: Normal breath sounds.   Abdominal:      General: There is no distension.      Palpations: Abdomen is soft.      Tenderness: There is abdominal tenderness. There is no guarding or rebound.   Skin:     General: Skin is warm and dry.      Coloration: Skin is jaundiced.   Neurological:      General: No focal deficit present.      Mental Status: He is alert.   Psychiatric:         Mood and Affect: Mood normal.         Behavior: Behavior normal.           Labs:  Recent Labs     12/26/23 0427   SODIUM 135   POTASSIUM 3.4*   CHLORIDE 103   CO2 20   BUN 12   CREATININE 0.63   MAGNESIUM 1.9   CALCIUM 8.0*     Recent Labs     12/26/23 0427   ALTSGPT 142*   ASTSGOT 87*   ALKPHOSPHAT 466*   TBILIRUBIN 6.1*   GLUCOSE 113*     Recent Labs     12/26/23 0427   WBC 27.0*   NEUTSPOLYS 85.30*   LYMPHOCYTES 6.30*   MONOCYTES 7.30   EOSINOPHILS 0.10   BASOPHILS 0.30   ASTSGOT 87*   ALTSGPT 142*   ALKPHOSPHAT 466*   TBILIRUBIN 6.1*     Recent Labs     12/26/23 0427   RBC 4.10*   HEMOGLOBIN 11.5*   HEMATOCRIT 35.0*   PLATELETCT 316     Recent Results (from the past 24 hour(s))   CBC WITH DIFFERENTIAL    Collection Time: 12/26/23  4:27 AM   Result Value Ref Range    WBC 27.0 (H) 4.8 - 10.8 K/uL    RBC 4.10 (L) 4.70 - 6.10 M/uL    Hemoglobin 11.5 (L) 14.0 - 18.0 g/dL    Hematocrit 35.0 (L) 42.0 - 52.0 %    MCV 85.4 81.4 - 97.8 fL    MCH 28.0 27.0 - 33.0 pg    MCHC 32.9 32.3 - 36.5 g/dL    RDW 43.8 35.9 - 50.0 fL    Platelet Count 316 164 - 446 K/uL    MPV 9.5 9.0 - 12.9 fL    Neutrophils-Polys 85.30 (H) 44.00 - 72.00 %    Lymphocytes 6.30 (L) 22.00 - 41.00 %    Monocytes 7.30  0.00 - 13.40 %    Eosinophils 0.10 0.00 - 6.90 %    Basophils 0.30 0.00 - 1.80 %    Immature Granulocytes 0.70 0.00 - 0.90 %    Nucleated RBC 0.00 0.00 - 0.20 /100 WBC    Neutrophils (Absolute) 23.03 (H) 1.82 - 7.42 K/uL    Lymphs (Absolute) 1.70 1.00 - 4.80 K/uL    Monos (Absolute) 1.97 (H) 0.00 - 0.85 K/uL    Eos (Absolute) 0.03 0.00 - 0.51 K/uL    Baso (Absolute) 0.08 0.00 - 0.12 K/uL    Immature Granulocytes (abs) 0.20 (H) 0.00 - 0.11 K/uL    NRBC (Absolute) 0.00 K/uL   MAGNESIUM    Collection Time: 12/26/23  4:27 AM   Result Value Ref Range    Magnesium 1.9 1.5 - 2.5 mg/dL   PROCALCITONIN    Collection Time: 12/26/23  4:27 AM   Result Value Ref Range    Procalcitonin 4.20 (H) <0.25 ng/mL   LACTIC ACID    Collection Time: 12/26/23  4:27 AM   Result Value Ref Range    Lactic Acid 0.8 0.5 - 2.0 mmol/L   Comp Metabolic Panel    Collection Time: 12/26/23  4:27 AM   Result Value Ref Range    Sodium 135 135 - 145 mmol/L    Potassium 3.4 (L) 3.6 - 5.5 mmol/L    Chloride 103 96 - 112 mmol/L    Co2 20 20 - 33 mmol/L    Anion Gap 12.0 7.0 - 16.0    Glucose 113 (H) 65 - 99 mg/dL    Bun 12 8 - 22 mg/dL    Creatinine 0.63 0.50 - 1.40 mg/dL    Calcium 8.0 (L) 8.5 - 10.5 mg/dL    Correct Calcium 9.0 8.5 - 10.5 mg/dL    AST(SGOT) 87 (H) 12 - 45 U/L    ALT(SGPT) 142 (H) 2 - 50 U/L    Alkaline Phosphatase 466 (H) 30 - 99 U/L    Total Bilirubin 6.1 (H) 0.1 - 1.5 mg/dL    Albumin 2.8 (L) 3.2 - 4.9 g/dL    Total Protein 6.7 6.0 - 8.2 g/dL    Globulin 3.9 (H) 1.9 - 3.5 g/dL    A-G Ratio 0.7 g/dL   ESTIMATED GFR    Collection Time: 12/26/23  4:27 AM   Result Value Ref Range    GFR (CKD-EPI) 114 >60 mL/min/1.73 m 2         Imaging:  No image results found.        MDM Data Review:  -Records reviewed and summarized in current documentation  -I personally reviewed and interpreted the laboratory results  -I personally reviewed the radiology images  -I have personally reviewed medications    Hospital Problem List:  Active Hospital Problems     Diagnosis     Sepsis with acute liver failure without hepatic coma or septic shock (HCC) [A41.9, R65.20, K72.00]     Cholangitis [K83.09]     Acute biliary pancreatitis without infection or necrosis [K85.10]     Class 1 obesity due to excess calories with serious comorbidity and body mass index (BMI) of 31.0 to 31.9 in adult [E66.09, Z68.31]        Impressions:  53-year-old male with fluid collection in the left lobe of the liver and a dilated biliary system down to the ampulla.  Patient presents with gallstone pancreatitis and signs consistent with a ascending cholangitis.  He needs urgent ERCP.  His gallbladder wall is quite thickened.  He is going to need cholecystectomy.  The fluid collection in the left lobe of the liver will likely need to be drained.    The risk, benefits, and alternatives were discussed in detail. Risks include acute pancreatitis, bowel perforation, procedure related bleeding event, infection, inability to safely complete the exam, sedation related complications. The patient, understanding the discussion, consents to proceed forward.      Recommendations:  Proceed with ERCP today  Observe clinical course and consider repeat cross-sectional imaging given fluid collection in the left lobe of the liver which may need interventional radiology to drain.  Consider surgical consultation.  Patient has a thickened gallbladder wall and this will need to be removed.  Continue with Zosyn.   severe

## 2023-12-26 NOTE — CARE PLAN
The patient is Watcher - Medium risk of patient condition declining or worsening    Shift Goals  Clinical Goals: ERCP, GI recs  Patient Goals: ERCP    Progress made toward(s) clinical / shift goals:  went down to get ERCP, on the way back up, on clears, further work up needed per PACU      Problem: Knowledge Deficit - Standard  Goal: Patient and family/care givers will demonstrate understanding of plan of care, disease process/condition, diagnostic tests and medications  Outcome: Progressing  Note: Pt educated on current POC, expected outcomes and goals, and new medications ordered. All questions and concerns have been answered at this time. MD educated pt on disease pathology and work up. GI MD also came by this morning to talk to pt and wife.        Problem: Pain - Standard  Goal: Alleviation of pain or a reduction in pain to the patient’s comfort goal  Outcome: Progressing  Note: Current pain regimen effective on getting patients pain level under control as needed, per patient. Educated on alternative pain relief therapies such as music, games, heat/cold, TV as distraction, walking in the halls or in room, and controlled breathing techniques. Denies needing any pain control this morning.             single , former smoker , social etoh , no kids , does not work , lives with sister ,    difficulty walking and getting around due to depression and back pain ,

## 2023-12-26 NOTE — ASSESSMENT & PLAN NOTE
On admission, GI was consulted patient underwent ERCP with biliary sphincterotomy, calculi removal, biliary stent insertion 12/26.  Stent removed during surgery.    General surgery was consulted, patient underwent laparoscopic cholecystectomy converted to open cholecystectomy with hepaticojejunostomy for proximal common bile duct injury near the hepatic bifurcation 12/27.    LILIAN drain in place, he is to follow-up with hepatobiliary surgery on 1/10 for drain and staple removal. Nursing staff to instruct patient on how to maintain, manage and drain LILIAN drain.    Eulalia

## 2023-12-26 NOTE — ANESTHESIA PROCEDURE NOTES
Airway    Date/Time: 12/26/2023 11:27 AM    Performed by: Lizbeth Leahy M.D.  Authorized by: Lizbeth Leahy M.D.    Location:  OR  Urgency:  Elective  Indications for Airway Management:  Anesthesia      Spontaneous Ventilation: absent    Sedation Level:  Deep  Preoxygenated: Yes    Patient Position:  Sniffing  MILS Maintained Throughout: Yes    Mask Difficulty Assessment:  1 - vent by mask  Final Airway Type:  Endotracheal airway  Final Endotracheal Airway:  ETT  Cuffed: Yes    Technique Used for Successful ETT Placement:  Direct laryngoscopy    Insertion Site:  Oral  Blade Type:  Temple  Laryngoscope Blade/Videolaryngoscope Blade Size:  2  ETT Size (mm):  7.5  Measured from:  Lips  ETT to Lips (cm):  21  Placement Verified by: capnometry    Cormack-Lehane Classification:  Grade I - full view of glottis  Number of Attempts at Approach:  1

## 2023-12-26 NOTE — HOSPITAL COURSE
Mr. Gupta is 53 yr old male(German speaking) with no significant PMHx was transferred from outside facility with chief complaint of intermittent abdominal [id number]. He had high fever, low blood pressure and tachycardic. CT abdomen showed cholangitis, gallstone pancreatitis with elevated LFTs, billirubin and procal(correlating with disease severity).     He was scheduled for inpatient ERCP on 12/26/23 by Dr. Mitesh shelby.

## 2023-12-26 NOTE — ANESTHESIA PREPROCEDURE EVALUATION
Case: [id number] Date/Time: 12/26/23 1040    Procedure: ERCP (ENDOSCOPIC RETROGRADE CHOLANGIOPANCREATOGRAPHY)    Location: [facility] ROOM 26 / SURGERY SAME DAY [facility]    Surgeons: Darwin Puente M.D.          53yoM with acute liver failure and sepsis due to ascending cholangitia    NPO  No AC  NKDA    Labs reviewed    Relevant Problems      (positive) Sepsis with acute liver failure without hepatic coma or septic shock (HCC)       Physical Exam    Airway   Mallampati: II       Cardiovascular - normal exam     Dental - normal exam           Pulmonary - normal exam     Abdominal - normal exam     Neurological - normal exam                   Anesthesia Plan    ASA 3   ASA physical status 3 criteria: other (comment)    Plan - general         (Ascending cholangitis with sepsis)      Induction: intravenous    Postoperative Plan: Postoperative administration of opioids is intended.    Pertinent diagnostic labs and testing reviewed    Informed Consent:    Anesthetic plan and risks discussed with patient.

## 2023-12-26 NOTE — PROGRESS NOTES
[facility] Internal Medicine Daily Progress Note    Date of Service  12/26/2023    [facility] Team: R IM [facility] Team   Attending: Cait Orlando M.d.  Senior Resident: Dr. Hobbs  Intern:  Dr. Coffman  Contact Number: [phone]    Chief Complaint  Sharath Gupta is a 53 y.o. male with no significant PMHx admitted 12/26/2023 with intermittent upper abdominal pain associated with fever/chills.    Hospital Course  Mr. Gupta is a 54 yo male with no significant PMHx was transferred from outside facility with cholangitis. His symptoms started 2month ago with intermittent abdominal pain associated with fever, denies nausea/vomiting. On this admission he was found to have tachycarida, leukocytosis, hyperbillirubinemia, elevated pro-celestine and lipase. Elevated LFTs. CT imaging done outside facility showed cholangitis, gallstone pancreatitis.    On 12/26/23, pt had ERCP by Dr. Mitesh pérez.     Interval Problem Update  -Aox4, Afebrile, denies nausea, vomiting, diarrhea. C/o epigastric and right upper quadrant abdominal pain.  -S/p ERCP today  -hypokalemia-repleted.  -Gen surgery consulted for cholycytectomy    I have discussed this patient's plan of care and discharge plan at IDT rounds today with Case Management, Nursing, Nursing leadership, and other members of the IDT team.    Consultants/Specialty  GI    Code Status  Full Code    Disposition  The patient is not medically cleared for discharge to home or a post-acute facility.      I have placed the appropriate orders for post-discharge needs.    Review of Systems  Review of Systems   Constitutional:  Negative for chills and fever.   HENT:  Negative for hearing loss.    Eyes:  Negative for blurred vision and double vision.   Respiratory:  Negative for cough, sputum production, shortness of breath and wheezing.    Cardiovascular:  Negative for chest pain, palpitations, orthopnea and leg swelling.   Gastrointestinal:  Positive for abdominal pain. Negative for nausea and vomiting.    Genitourinary:  Negative for dysuria and urgency.   Musculoskeletal:  Negative for myalgias.   Neurological:  Negative for tingling and headaches.   Psychiatric/Behavioral:  Negative for depression and suicidal ideas.         Physical Exam  Temp:  [36.1 °C (96.9 °F)-36.8 °C (98.2 °F)] 36.4 °C (97.6 °F)  Pulse:  [77-91] 91  Resp:  [18] 18  BP: (107-128)/(67-70) 128/70  SpO2:  [93 %-96 %] 95 %    Physical Exam  Constitutional:       Appearance: Normal appearance.   HENT:      Head: Normocephalic and atraumatic.      Mouth/Throat:      Mouth: Mucous membranes are moist.      Pharynx: Oropharynx is clear.   Eyes:      Extraocular Movements: Extraocular movements intact.      Pupils: Pupils are equal, round, and reactive to light.   Cardiovascular:      Rate and Rhythm: Normal rate and regular rhythm.      Pulses: Normal pulses.      Heart sounds: Normal heart sounds.   Pulmonary:      Effort: Pulmonary effort is normal.      Breath sounds: Normal breath sounds.   Abdominal:      Tenderness: There is abdominal tenderness. There is no right CVA tenderness or left CVA tenderness.      Comments: Positive soto sign   Musculoskeletal:         General: Normal range of motion.      Cervical back: Normal range of motion and neck supple.      Right lower leg: No edema.      Left lower leg: No edema.   Skin:     Capillary Refill: Capillary refill takes less than 2 seconds.   Neurological:      General: No focal deficit present.      Mental Status: He is alert and oriented to person, place, and time. Mental status is at baseline.   Psychiatric:         Mood and Affect: Mood normal.         Thought Content: Thought content normal.         Judgment: Judgment normal.         Fluids  No intake or output data in the 24 hours ending 12/26/23 1032    Laboratory  Recent Labs     12/26/23  0427   WBC 27.0*   RBC 4.10*   HEMOGLOBIN 11.5*   HEMATOCRIT 35.0*   MCV 85.4   MCH 28.0   MCHC 32.9   RDW 43.8   PLATELETCT 316   MPV 9.5     Recent  Labs     12/26/23  0427   SODIUM 135   POTASSIUM 3.4*   CHLORIDE 103   CO2 20   GLUCOSE 113*   BUN 12   CREATININE 0.63   CALCIUM 8.0*                   Imaging  OUTSIDE IMAGES-CT ABDOMEN /PELVIS   Final Result      LW-VOEP-TYURDWS DUCTS    (Results Pending)        Assessment/Plan  Problem Representation:    * Sepsis with acute liver failure without hepatic coma or septic shock (HCC)- (present on admission)  Assessment & Plan  This is Sepsis Present on admission  SIRS criteria identified on my evaluation include:   Fever, with temperature greater than 100.9 deg F, Tachycardia, with heart rate greater than 90 BPM, and Leukocytosis, with WBC greater than 12,000  Clinical indicators of end organ dysfunction include Acute Liver Failure, with bilirubin greater than 2  Source is cholangitis  Sepsis protocol initiated  Crystalloid Fluid Administration: At outside facility  IV antibiotics as appropriate for source of sepsis  Reassessment: I have reassessed the patient's hemodynamic status  Blood cultures drawn at outside facility, did not follow  Started on Zosyn 12/26/23    Acute biliary pancreatitis without infection or necrosis- (present on admission)  Assessment & Plan  Notes on CT abdomen on 12/25/23-Gallstone pancreatitis. Denies nausea, vomiting, diarrhea but c/o epigastric pain and tenderness on physical exam. No personal hx of alcohol abuse except occasional drinking.    -continue IV fluid.  Patient is in significant amount of pain requiring narcotic pain management, close hemodynamic and respiratory status monitoring. At risk of deterioration  -GI recommended gen surg consultation for cholycystectomy      Cholangitis- (present on admission)  Assessment & Plan  Pt was transferred from outside facility after CT findings of cholangitis. Pt reported he started having symptoms of upper abdominal pain, fever 2months ago, noticed yellowing of his skin that brought his medical attention then. On physical exam Typical  triad Fever, jaundice, RUQ pain, positive soto sign  was present. GI consult was sought on 12/26/23    -S/p ERCP  -appropriate pain regiment PRN  -Tylenol for fever  -Continue IV fluids, close monitoring of vitals, pt at risk of deterioration.  -Continue IV ZOSYN 12/26/23-  -Low fat low salt diet.  -Blood cultures to be verified from outside facility    Elevated LFTs  Assessment & Plan  Elevated LFTs in the setting of cholangitis.    See A&P for cholangitis  -daily CMP and close monitoring  -avoid hepatotoxic agents.      Neutrophilic leukocytosis  Assessment & Plan  Elevated leukocytes 27 with neutrophillic predominence 85% in the setting of cholangitis, sepsis(on admission)  -Trend on daily CBC with differential.  -see A&P for cholangitis    Hypokalemia  Assessment & Plan  Noted on this admission  -Replete as needed    Class 1 obesity due to excess calories with serious comorbidity and body mass index (BMI) of 31.0 to 31.9 in adult- (present on admission)  Assessment & Plan  BMI 31.84, patient was counselled on healthy diet and lifestyle and risk of cardiovascular diseases due to obesity.         VTE prophylaxis: SCDs/TEDs    I have performed a physical exam and reviewed and updated ROS and Plan today (12/26/2023). In review of yesterday's note (12/25/2023), there are no changes except as documented above.

## 2023-12-26 NOTE — ASSESSMENT & PLAN NOTE
Labs reviewed, resolved  Serial BMP, magnesium, phosphorus levels ordered for tomorrow morning to continue to monitor electrolytes

## 2023-12-26 NOTE — OR NURSING
1150- pt arrived to PACU, report received.  Pt on 6L mask, with OPA which was dc upon arrival.      1200- Dr. Puente at bedside, updating pt with help of  ipad.  Pt tolerating sips of water.      1221- Report called to KIANA Tompkins.  Pt placed on transport, recovery complete.      1322- pt transported out of PACU, back to room, via transport.  Order in place that pt can travel without telemetry.

## 2023-12-27 ENCOUNTER — ANESTHESIA EVENT (OUTPATIENT)
Dept: SURGERY | Facility: MEDICAL CENTER | Age: 53
DRG: 853 | End: 2023-12-27

## 2023-12-27 ENCOUNTER — ANESTHESIA (OUTPATIENT)
Dept: SURGERY | Facility: MEDICAL CENTER | Age: 53
DRG: 853 | End: 2023-12-27

## 2023-12-27 ENCOUNTER — APPOINTMENT (OUTPATIENT)
Dept: RADIOLOGY | Facility: MEDICAL CENTER | Age: 53
DRG: 853 | End: 2023-12-27
Attending: SURGERY

## 2023-12-27 LAB
ALBUMIN SERPL BCP-MCNC: 2.6 G/DL (ref 3.2–4.9)
ALBUMIN/GLOB SERPL: 0.7 G/DL
ALP SERPL-CCNC: 364 U/L (ref 30–99)
ALT SERPL-CCNC: 97 U/L (ref 2–50)
ANION GAP SERPL CALC-SCNC: 11 MMOL/L (ref 7–16)
AST SERPL-CCNC: 33 U/L (ref 12–45)
BASOPHILS # BLD AUTO: 0.2 % (ref 0–1.8)
BASOPHILS # BLD: 0.03 K/UL (ref 0–0.12)
BILIRUB SERPL-MCNC: 1.7 MG/DL (ref 0.1–1.5)
BUN SERPL-MCNC: 13 MG/DL (ref 8–22)
CALCIUM ALBUM COR SERPL-MCNC: 9.4 MG/DL (ref 8.5–10.5)
CALCIUM SERPL-MCNC: 8.3 MG/DL (ref 8.5–10.5)
CHLORIDE SERPL-SCNC: 103 MMOL/L (ref 96–112)
CO2 SERPL-SCNC: 23 MMOL/L (ref 20–33)
CREAT SERPL-MCNC: 0.54 MG/DL (ref 0.5–1.4)
EKG IMPRESSION: NORMAL
EOSINOPHIL # BLD AUTO: 0 K/UL (ref 0–0.51)
EOSINOPHIL NFR BLD: 0 % (ref 0–6.9)
ERYTHROCYTE [DISTWIDTH] IN BLOOD BY AUTOMATED COUNT: 45 FL (ref 35.9–50)
GFR SERPLBLD CREATININE-BSD FMLA CKD-EPI: 120 ML/MIN/1.73 M 2
GLOBULIN SER CALC-MCNC: 4 G/DL (ref 1.9–3.5)
GLUCOSE SERPL-MCNC: 109 MG/DL (ref 65–99)
HCT VFR BLD AUTO: 35.1 % (ref 42–52)
HGB BLD-MCNC: 11.4 G/DL (ref 14–18)
IMM GRANULOCYTES # BLD AUTO: 0.11 K/UL (ref 0–0.11)
IMM GRANULOCYTES NFR BLD AUTO: 0.6 % (ref 0–0.9)
LIPASE SERPL-CCNC: 533 U/L (ref 11–82)
LYMPHOCYTES # BLD AUTO: 1.77 K/UL (ref 1–4.8)
LYMPHOCYTES NFR BLD: 9.9 % (ref 22–41)
MCH RBC QN AUTO: 27.9 PG (ref 27–33)
MCHC RBC AUTO-ENTMCNC: 32.5 G/DL (ref 32.3–36.5)
MCV RBC AUTO: 86 FL (ref 81.4–97.8)
MONOCYTES # BLD AUTO: 0.71 K/UL (ref 0–0.85)
MONOCYTES NFR BLD AUTO: 4 % (ref 0–13.4)
NEUTROPHILS # BLD AUTO: 15.21 K/UL (ref 1.82–7.42)
NEUTROPHILS NFR BLD: 85.3 % (ref 44–72)
NRBC # BLD AUTO: 0 K/UL
NRBC BLD-RTO: 0 /100 WBC (ref 0–0.2)
PATHOLOGY CONSULT NOTE: NORMAL
PLATELET # BLD AUTO: 366 K/UL (ref 164–446)
PMV BLD AUTO: 9.7 FL (ref 9–12.9)
POTASSIUM SERPL-SCNC: 4.1 MMOL/L (ref 3.6–5.5)
PROT SERPL-MCNC: 6.6 G/DL (ref 6–8.2)
RBC # BLD AUTO: 4.08 M/UL (ref 4.7–6.1)
SODIUM SERPL-SCNC: 137 MMOL/L (ref 135–145)
WBC # BLD AUTO: 17.8 K/UL (ref 4.8–10.8)

## 2023-12-27 PROCEDURE — 160035 HCHG PACU - 1ST 60 MINS PHASE I: Performed by: SURGERY

## 2023-12-27 PROCEDURE — 700111 HCHG RX REV CODE 636 W/ 250 OVERRIDE (IP): Performed by: ANESTHESIOLOGY

## 2023-12-27 PROCEDURE — 3E0T3BZ INTRODUCTION OF ANESTHETIC AGENT INTO PERIPHERAL NERVES AND PLEXI, PERCUTANEOUS APPROACH: ICD-10-PCS | Performed by: ANESTHESIOLOGY

## 2023-12-27 PROCEDURE — 83690 ASSAY OF LIPASE: CPT

## 2023-12-27 PROCEDURE — 93005 ELECTROCARDIOGRAM TRACING: CPT | Performed by: SURGERY

## 2023-12-27 PROCEDURE — 700105 HCHG RX REV CODE 258: Performed by: ANESTHESIOLOGY

## 2023-12-27 PROCEDURE — 85025 COMPLETE CBC W/AUTO DIFF WBC: CPT

## 2023-12-27 PROCEDURE — 160002 HCHG RECOVERY MINUTES (STAT): Performed by: SURGERY

## 2023-12-27 PROCEDURE — A9270 NON-COVERED ITEM OR SERVICE: HCPCS | Performed by: STUDENT IN AN ORGANIZED HEALTH CARE EDUCATION/TRAINING PROGRAM

## 2023-12-27 PROCEDURE — 160048 HCHG OR STATISTICAL LEVEL 1-5: Performed by: SURGERY

## 2023-12-27 PROCEDURE — 47600 CHOLECYSTECTOMY: CPT | Mod: AS | Performed by: NURSE PRACTITIONER

## 2023-12-27 PROCEDURE — 700102 HCHG RX REV CODE 250 W/ 637 OVERRIDE(OP): Performed by: STUDENT IN AN ORGANIZED HEALTH CARE EDUCATION/TRAINING PROGRAM

## 2023-12-27 PROCEDURE — 47600 CHOLECYSTECTOMY: CPT | Performed by: SURGERY

## 2023-12-27 PROCEDURE — 88304 TISSUE EXAM BY PATHOLOGIST: CPT

## 2023-12-27 PROCEDURE — 160036 HCHG PACU - EA ADDL 30 MINS PHASE I: Performed by: SURGERY

## 2023-12-27 PROCEDURE — 700105 HCHG RX REV CODE 258: Performed by: STUDENT IN AN ORGANIZED HEALTH CARE EDUCATION/TRAINING PROGRAM

## 2023-12-27 PROCEDURE — 770001 HCHG ROOM/CARE - MED/SURG/GYN PRIV*

## 2023-12-27 PROCEDURE — 160042 HCHG SURGERY MINUTES - EA ADDL 1 MIN LEVEL 5: Performed by: SURGERY

## 2023-12-27 PROCEDURE — 47780 FUSE BILE DUCTS AND BOWEL: CPT | Performed by: SURGERY

## 2023-12-27 PROCEDURE — 0F170ZB BYPASS COMMON HEPATIC DUCT TO SMALL INTESTINE, OPEN APPROACH: ICD-10-PCS | Performed by: SURGERY

## 2023-12-27 PROCEDURE — 0FT40ZZ RESECTION OF GALLBLADDER, OPEN APPROACH: ICD-10-PCS | Performed by: SURGERY

## 2023-12-27 PROCEDURE — 110371 HCHG SHELL REV 272: Performed by: SURGERY

## 2023-12-27 PROCEDURE — 36415 COLL VENOUS BLD VENIPUNCTURE: CPT

## 2023-12-27 PROCEDURE — 160009 HCHG ANES TIME/MIN: Performed by: SURGERY

## 2023-12-27 PROCEDURE — 700101 HCHG RX REV CODE 250: Performed by: SURGERY

## 2023-12-27 PROCEDURE — 700102 HCHG RX REV CODE 250 W/ 637 OVERRIDE(OP): Performed by: ANESTHESIOLOGY

## 2023-12-27 PROCEDURE — 80053 COMPREHEN METABOLIC PANEL: CPT

## 2023-12-27 PROCEDURE — 99233 SBSQ HOSP IP/OBS HIGH 50: CPT | Mod: GC | Performed by: INTERNAL MEDICINE

## 2023-12-27 PROCEDURE — 700111 HCHG RX REV CODE 636 W/ 250 OVERRIDE (IP): Mod: JZ | Performed by: STUDENT IN AN ORGANIZED HEALTH CARE EDUCATION/TRAINING PROGRAM

## 2023-12-27 PROCEDURE — 74300 X-RAY BILE DUCTS/PANCREAS: CPT

## 2023-12-27 PROCEDURE — 93010 ELECTROCARDIOGRAM REPORT: CPT | Performed by: STUDENT IN AN ORGANIZED HEALTH CARE EDUCATION/TRAINING PROGRAM

## 2023-12-27 PROCEDURE — A9270 NON-COVERED ITEM OR SERVICE: HCPCS | Performed by: ANESTHESIOLOGY

## 2023-12-27 PROCEDURE — 700101 HCHG RX REV CODE 250: Performed by: ANESTHESIOLOGY

## 2023-12-27 PROCEDURE — 700117 HCHG RX CONTRAST REV CODE 255: Performed by: SURGERY

## 2023-12-27 PROCEDURE — 64488 TAP BLOCK BI INJECTION: CPT | Performed by: SURGERY

## 2023-12-27 PROCEDURE — 160031 HCHG SURGERY MINUTES - 1ST 30 MINS LEVEL 5: Performed by: SURGERY

## 2023-12-27 RX ORDER — OXYCODONE HCL 5 MG/5 ML
10 SOLUTION, ORAL ORAL
Status: COMPLETED | OUTPATIENT
Start: 2023-12-27 | End: 2023-12-27

## 2023-12-27 RX ORDER — ONDANSETRON 2 MG/ML
INJECTION INTRAMUSCULAR; INTRAVENOUS PRN
Status: DISCONTINUED | OUTPATIENT
Start: 2023-12-27 | End: 2023-12-27 | Stop reason: SURG

## 2023-12-27 RX ORDER — OXYCODONE HCL 5 MG/5 ML
5 SOLUTION, ORAL ORAL
Status: COMPLETED | OUTPATIENT
Start: 2023-12-27 | End: 2023-12-27

## 2023-12-27 RX ORDER — DEXAMETHASONE SODIUM PHOSPHATE 4 MG/ML
INJECTION, SOLUTION INTRA-ARTICULAR; INTRALESIONAL; INTRAMUSCULAR; INTRAVENOUS; SOFT TISSUE PRN
Status: DISCONTINUED | OUTPATIENT
Start: 2023-12-27 | End: 2023-12-27 | Stop reason: SURG

## 2023-12-27 RX ORDER — LIDOCAINE HYDROCHLORIDE 20 MG/ML
INJECTION, SOLUTION EPIDURAL; INFILTRATION; INTRACAUDAL; PERINEURAL PRN
Status: DISCONTINUED | OUTPATIENT
Start: 2023-12-27 | End: 2023-12-27 | Stop reason: SURG

## 2023-12-27 RX ORDER — HYDROMORPHONE HYDROCHLORIDE 1 MG/ML
0.4 INJECTION, SOLUTION INTRAMUSCULAR; INTRAVENOUS; SUBCUTANEOUS
Status: DISCONTINUED | OUTPATIENT
Start: 2023-12-27 | End: 2023-12-27 | Stop reason: HOSPADM

## 2023-12-27 RX ORDER — SODIUM CHLORIDE, SODIUM LACTATE, POTASSIUM CHLORIDE, CALCIUM CHLORIDE 600; 310; 30; 20 MG/100ML; MG/100ML; MG/100ML; MG/100ML
INJECTION, SOLUTION INTRAVENOUS CONTINUOUS
Status: DISCONTINUED | OUTPATIENT
Start: 2023-12-27 | End: 2023-12-27 | Stop reason: HOSPADM

## 2023-12-27 RX ORDER — HYDROMORPHONE HYDROCHLORIDE 1 MG/ML
0.1 INJECTION, SOLUTION INTRAMUSCULAR; INTRAVENOUS; SUBCUTANEOUS
Status: DISCONTINUED | OUTPATIENT
Start: 2023-12-27 | End: 2023-12-27 | Stop reason: HOSPADM

## 2023-12-27 RX ORDER — BUPIVACAINE HYDROCHLORIDE AND EPINEPHRINE 5; 5 MG/ML; UG/ML
INJECTION, SOLUTION EPIDURAL; INTRACAUDAL; PERINEURAL
Status: DISCONTINUED | OUTPATIENT
Start: 2023-12-27 | End: 2023-12-27 | Stop reason: HOSPADM

## 2023-12-27 RX ORDER — LIDOCAINE HYDROCHLORIDE 40 MG/ML
SOLUTION TOPICAL PRN
Status: DISCONTINUED | OUTPATIENT
Start: 2023-12-27 | End: 2023-12-27 | Stop reason: SURG

## 2023-12-27 RX ORDER — CELECOXIB 200 MG/1
400 CAPSULE ORAL ONCE
Status: COMPLETED | OUTPATIENT
Start: 2023-12-27 | End: 2023-12-27

## 2023-12-27 RX ORDER — HYDROMORPHONE HYDROCHLORIDE 2 MG/ML
INJECTION, SOLUTION INTRAMUSCULAR; INTRAVENOUS; SUBCUTANEOUS PRN
Status: DISCONTINUED | OUTPATIENT
Start: 2023-12-27 | End: 2023-12-27 | Stop reason: SURG

## 2023-12-27 RX ORDER — HALOPERIDOL 5 MG/ML
1 INJECTION INTRAMUSCULAR
Status: DISCONTINUED | OUTPATIENT
Start: 2023-12-27 | End: 2023-12-27 | Stop reason: HOSPADM

## 2023-12-27 RX ORDER — ACETAMINOPHEN 500 MG
1000 TABLET ORAL ONCE
Status: COMPLETED | OUTPATIENT
Start: 2023-12-27 | End: 2023-12-27

## 2023-12-27 RX ORDER — MEPERIDINE HYDROCHLORIDE 25 MG/ML
12.5 INJECTION INTRAMUSCULAR; INTRAVENOUS; SUBCUTANEOUS
Status: DISCONTINUED | OUTPATIENT
Start: 2023-12-27 | End: 2023-12-27 | Stop reason: HOSPADM

## 2023-12-27 RX ORDER — ONDANSETRON 2 MG/ML
4 INJECTION INTRAMUSCULAR; INTRAVENOUS
Status: DISCONTINUED | OUTPATIENT
Start: 2023-12-27 | End: 2023-12-27 | Stop reason: HOSPADM

## 2023-12-27 RX ORDER — HYDROMORPHONE HYDROCHLORIDE 1 MG/ML
0.2 INJECTION, SOLUTION INTRAMUSCULAR; INTRAVENOUS; SUBCUTANEOUS
Status: DISCONTINUED | OUTPATIENT
Start: 2023-12-27 | End: 2023-12-27 | Stop reason: HOSPADM

## 2023-12-27 RX ORDER — BUPIVACAINE HYDROCHLORIDE 5 MG/ML
INJECTION, SOLUTION EPIDURAL; INTRACAUDAL PRN
Status: DISCONTINUED | OUTPATIENT
Start: 2023-12-27 | End: 2023-12-27 | Stop reason: SURG

## 2023-12-27 RX ORDER — SODIUM CHLORIDE, SODIUM LACTATE, POTASSIUM CHLORIDE, CALCIUM CHLORIDE 600; 310; 30; 20 MG/100ML; MG/100ML; MG/100ML; MG/100ML
INJECTION, SOLUTION INTRAVENOUS
Status: DISCONTINUED | OUTPATIENT
Start: 2023-12-27 | End: 2023-12-27 | Stop reason: SURG

## 2023-12-27 RX ADMIN — ONDANSETRON 4 MG: 2 INJECTION INTRAMUSCULAR; INTRAVENOUS at 14:36

## 2023-12-27 RX ADMIN — PIPERACILLIN AND TAZOBACTAM 3.38 G: 3; .375 INJECTION, POWDER, FOR SOLUTION INTRAVENOUS at 22:41

## 2023-12-27 RX ADMIN — HYDROMORPHONE HYDROCHLORIDE 1 MG: 2 INJECTION INTRAMUSCULAR; INTRAVENOUS; SUBCUTANEOUS at 11:26

## 2023-12-27 RX ADMIN — FENTANYL CITRATE 50 MCG: 50 INJECTION, SOLUTION INTRAMUSCULAR; INTRAVENOUS at 15:34

## 2023-12-27 RX ADMIN — Medication 1 LOZENGE: at 05:37

## 2023-12-27 RX ADMIN — METHOCARBAMOL 1000 MG: 100 INJECTION, SOLUTION INTRAMUSCULAR; INTRAVENOUS at 16:56

## 2023-12-27 RX ADMIN — FENTANYL CITRATE 100 MCG: 50 INJECTION, SOLUTION INTRAMUSCULAR; INTRAVENOUS at 11:06

## 2023-12-27 RX ADMIN — SODIUM CHLORIDE, POTASSIUM CHLORIDE, SODIUM LACTATE AND CALCIUM CHLORIDE: 600; 310; 30; 20 INJECTION, SOLUTION INTRAVENOUS at 14:09

## 2023-12-27 RX ADMIN — MORPHINE SULFATE 2 MG: 4 INJECTION, SOLUTION INTRAMUSCULAR; INTRAVENOUS at 18:31

## 2023-12-27 RX ADMIN — TRAMADOL HYDROCHLORIDE 50 MG: 50 TABLET ORAL at 20:45

## 2023-12-27 RX ADMIN — HYDROMORPHONE HYDROCHLORIDE 0.5 MG: 2 INJECTION INTRAMUSCULAR; INTRAVENOUS; SUBCUTANEOUS at 13:32

## 2023-12-27 RX ADMIN — ACETAMINOPHEN 1000 MG: 500 TABLET ORAL at 09:57

## 2023-12-27 RX ADMIN — HYDROMORPHONE HYDROCHLORIDE 0.2 MG: 1 INJECTION, SOLUTION INTRAMUSCULAR; INTRAVENOUS; SUBCUTANEOUS at 16:24

## 2023-12-27 RX ADMIN — HYDROMORPHONE HYDROCHLORIDE 0.4 MG: 1 INJECTION, SOLUTION INTRAMUSCULAR; INTRAVENOUS; SUBCUTANEOUS at 16:18

## 2023-12-27 RX ADMIN — CELECOXIB 400 MG: 200 CAPSULE ORAL at 09:57

## 2023-12-27 RX ADMIN — MIDAZOLAM HYDROCHLORIDE 2 MG: 2 INJECTION, SOLUTION INTRAMUSCULAR; INTRAVENOUS at 10:38

## 2023-12-27 RX ADMIN — ROCURONIUM BROMIDE 20 MG: 10 INJECTION, SOLUTION INTRAVENOUS at 13:03

## 2023-12-27 RX ADMIN — SODIUM CHLORIDE, POTASSIUM CHLORIDE, SODIUM LACTATE AND CALCIUM CHLORIDE: 600; 310; 30; 20 INJECTION, SOLUTION INTRAVENOUS at 18:39

## 2023-12-27 RX ADMIN — DOCUSATE SODIUM 50 MG AND SENNOSIDES 8.6 MG 2 TABLET: 8.6; 5 TABLET, FILM COATED ORAL at 05:37

## 2023-12-27 RX ADMIN — SUGAMMADEX 200 MG: 100 INJECTION, SOLUTION INTRAVENOUS at 15:03

## 2023-12-27 RX ADMIN — ROCURONIUM BROMIDE 10 MG: 10 INJECTION, SOLUTION INTRAVENOUS at 11:40

## 2023-12-27 RX ADMIN — SODIUM CHLORIDE, POTASSIUM CHLORIDE, SODIUM LACTATE AND CALCIUM CHLORIDE: 600; 310; 30; 20 INJECTION, SOLUTION INTRAVENOUS at 10:38

## 2023-12-27 RX ADMIN — ROCURONIUM BROMIDE 20 MG: 10 INJECTION, SOLUTION INTRAVENOUS at 14:03

## 2023-12-27 RX ADMIN — ROCURONIUM BROMIDE 50 MG: 10 INJECTION, SOLUTION INTRAVENOUS at 10:43

## 2023-12-27 RX ADMIN — PROPOFOL 140 MG: 10 INJECTION, EMULSION INTRAVENOUS at 10:43

## 2023-12-27 RX ADMIN — OXYCODONE HYDROCHLORIDE 10 MG: 5 SOLUTION ORAL at 15:22

## 2023-12-27 RX ADMIN — HYDROMORPHONE HYDROCHLORIDE 0.4 MG: 1 INJECTION, SOLUTION INTRAMUSCULAR; INTRAVENOUS; SUBCUTANEOUS at 15:53

## 2023-12-27 RX ADMIN — DEXAMETHASONE SODIUM PHOSPHATE 4 MG: 4 INJECTION INTRA-ARTICULAR; INTRALESIONAL; INTRAMUSCULAR; INTRAVENOUS; SOFT TISSUE at 10:49

## 2023-12-27 RX ADMIN — LIDOCAINE HYDROCHLORIDE 80 MG: 20 INJECTION, SOLUTION EPIDURAL; INFILTRATION; INTRACAUDAL at 10:43

## 2023-12-27 RX ADMIN — BUPIVACAINE HYDROCHLORIDE 15 ML: 5 INJECTION, SOLUTION EPIDURAL; INTRACAUDAL at 14:55

## 2023-12-27 RX ADMIN — FENTANYL CITRATE 150 MCG: 50 INJECTION, SOLUTION INTRAMUSCULAR; INTRAVENOUS at 10:43

## 2023-12-27 RX ADMIN — FENTANYL CITRATE 50 MCG: 50 INJECTION, SOLUTION INTRAMUSCULAR; INTRAVENOUS at 15:22

## 2023-12-27 RX ADMIN — PIPERACILLIN AND TAZOBACTAM 3.38 G: 3; .375 INJECTION, POWDER, FOR SOLUTION INTRAVENOUS at 08:11

## 2023-12-27 RX ADMIN — SODIUM CHLORIDE, POTASSIUM CHLORIDE, SODIUM LACTATE AND CALCIUM CHLORIDE: 600; 310; 30; 20 INJECTION, SOLUTION INTRAVENOUS at 11:58

## 2023-12-27 RX ADMIN — BUPIVACAINE HYDROCHLORIDE 15 ML: 5 INJECTION, SOLUTION EPIDURAL; INTRACAUDAL at 15:01

## 2023-12-27 RX ADMIN — ROCURONIUM BROMIDE 20 MG: 10 INJECTION, SOLUTION INTRAVENOUS at 12:14

## 2023-12-27 RX ADMIN — MORPHINE SULFATE 2 MG: 4 INJECTION, SOLUTION INTRAMUSCULAR; INTRAVENOUS at 22:53

## 2023-12-27 RX ADMIN — LIDOCAINE HYDROCHLORIDE 4 ML: 40 SOLUTION TOPICAL at 10:46

## 2023-12-27 RX ADMIN — HYDROMORPHONE HYDROCHLORIDE 0.5 MG: 2 INJECTION INTRAMUSCULAR; INTRAVENOUS; SUBCUTANEOUS at 12:18

## 2023-12-27 ASSESSMENT — PAIN DESCRIPTION - PAIN TYPE
TYPE: SURGICAL PAIN
TYPE: ACUTE PAIN
TYPE: SURGICAL PAIN

## 2023-12-27 ASSESSMENT — ENCOUNTER SYMPTOMS
SHORTNESS OF BREATH: 0
COUGH: 0
TINGLING: 0
WHEEZING: 0
ORTHOPNEA: 0
NAUSEA: 0
MYALGIAS: 0
CHILLS: 0
DOUBLE VISION: 0
PALPITATIONS: 0
VOMITING: 0
SPUTUM PRODUCTION: 0
HEADACHES: 0
FEVER: 0
ABDOMINAL PAIN: 0
BLURRED VISION: 0
DEPRESSION: 0

## 2023-12-27 ASSESSMENT — PAIN SCALES - GENERAL: PAIN_LEVEL: 6

## 2023-12-27 ASSESSMENT — PATIENT HEALTH QUESTIONNAIRE - PHQ9
1. LITTLE INTEREST OR PLEASURE IN DOING THINGS: NOT AT ALL
SUM OF ALL RESPONSES TO PHQ9 QUESTIONS 1 AND 2: 0
2. FEELING DOWN, DEPRESSED, IRRITABLE, OR HOPELESS: NOT AT ALL

## 2023-12-27 ASSESSMENT — FIBROSIS 4 INDEX: FIB4 SCORE: 1.2

## 2023-12-27 NOTE — CARE PLAN
The patient is Stable - Low risk of patient condition declining or worsening    Shift Goals  Clinical Goals: ABX, pain management  Patient Goals: sleep  Family Goals: emma    Progress made toward(s) clinical / shift goals:    Problem: Knowledge Deficit - Standard  Goal: Patient and family/care givers will demonstrate understanding of plan of care, disease process/condition, diagnostic tests and medications  Outcome: Progressing  Note: POC discussed with pt, all questions answered at this time     Problem: Pain - Standard  Goal: Alleviation of pain or a reduction in pain to the patient’s comfort goal  Outcome: Progressing  Note: Pt using numbers scale to rate pain, medicated appropriately       Patient is not progressing towards the following goals:

## 2023-12-27 NOTE — ANESTHESIA PROCEDURE NOTES
Peripheral Block    Date/Time: 12/27/2023 2:50 PM    Performed by: Niki Casey M.D.  Authorized by: Niki Casey M.D.    Patient Location:  OR  Start Time:  12/27/2023 2:50 PM  End Time:  12/27/2023 3:03 PM  Reason for Block: at surgeon's request and post-op pain management ONLY    patient identified, IV checked, site marked, risks and benefits discussed, surgical consent, monitors and equipment checked, pre-op evaluation and timeout performed    Patient Position:  Supine  Prep: ChloraPrep    Monitoring:  Heart rate, continuous pulse ox and cardiac monitor  Block Region:  Trunk  Trunk - Block Type:  Abdominal plane block - TAP block    Laterality:  Bilateral  Procedures: ultrasound guided  Image captured, interpreted and electronically stored.  Strength:  1 %  Dose:  3 ml  Block Type:  Single-shot  Needle Length:  100mm  Needle Gauge:  21 G  Needle Localization:  Ultrasound guidance  Ultrasound picture in chart  Injection Assessment:  Negative aspiration for heme, no paresthesia on injection, incremental injection and local visualized surrounding nerve on ultrasound  Evidence of intravascular injection: No (negative aspiration for air)     US Guided Transversus Abdominis Plane (TAP) Block   US probe placed at the anterior axillary line between the costal margin and iliac crest in an axial plane. External Oblique, Internal Oblique (IO) and Transversis Abdominis (TA) muscles identified.  Needle inserted anteromedial to probe in an in plane approach and advanced under direct visualization to TAP between IO and TA muscled.  After negative aspiration LA injected with ease and visualized spreading in TAP plane. Planes difficult to visualize.

## 2023-12-27 NOTE — PROGRESS NOTES
[facility] Internal Medicine Daily Progress Note    Date of Service  12/27/2023    [facility] Team: R IM Toth Team   Attending: Cait Orlando M.d.  Senior Resident: Dr. Hobbs  Intern:  Dr. Coffman  Contact Number: [phone]    Chief Complaint  Sharath Gupta is a 53 y.o. male with no significant PMHx admitted 12/26/2023 with intermittent upper abdominal pain associated with fever/chills.    Hospital Course  Mr. Gupta is a 54 yo male with no significant PMHx was transferred from outside facility with cholangitis. His symptoms started 2month ago with intermittent abdominal pain associated with fever, denies nausea/vomiting. On this admission he was found to have tachycarida, leukocytosis, hyperbillirubinemia, elevated pro-celestine and lipase. Elevated LFTs. CT imaging done outside facility showed cholangitis, gallstone pancreatitis.    On 12/26/23, pt had ERCP with biliary sphincterotomy, calculi removal, biliary stent insertion  by Dr. Mitesh pérez(Stent removal by ERCP in 4 to 6 weeks (1/26/23).   On 12/27/23, pt had lap cholecystectomy converted to open cholecystectomy.     Interval Problem Update  -Aox4, Afebrile, denies nausea, vomiting, diarrhea.   -LFTs and bilirubin trending down.  -S/p open cholecystectomy  -Pending gallbladder biopsy specimen.    I have discussed this patient's plan of care and discharge plan at IDT rounds today with Case Management, Nursing, Nursing leadership, and other members of the IDT team.    Consultants/Specialty  GI    Code Status  Full Code    Disposition  The patient is not medically cleared for discharge to home or a post-acute facility.    I have placed the appropriate orders for post-discharge needs.    Review of Systems  Review of Systems   Constitutional:  Negative for chills and fever.   HENT:  Negative for hearing loss.    Eyes:  Negative for blurred vision and double vision.   Respiratory:  Negative for cough, sputum production, shortness of breath and wheezing.    Cardiovascular:   Negative for chest pain, palpitations, orthopnea and leg swelling.   Gastrointestinal:  Negative for abdominal pain, nausea and vomiting.   Genitourinary:  Negative for dysuria and urgency.   Musculoskeletal:  Negative for myalgias.   Neurological:  Negative for tingling and headaches.   Psychiatric/Behavioral:  Negative for depression and suicidal ideas.         Physical Exam  Temp:  [36 °C (96.8 °F)-36.6 °C (97.8 °F)] 36.1 °C (97 °F)  Pulse:  [78-97] 95  Resp:  [15-29] 16  BP: ()/(57-96) 123/75  SpO2:  [92 %-99 %] 94 %    Physical Exam  Constitutional:       Appearance: Normal appearance.   HENT:      Head: Normocephalic and atraumatic.      Mouth/Throat:      Mouth: Mucous membranes are moist.      Pharynx: Oropharynx is clear.   Eyes:      Extraocular Movements: Extraocular movements intact.      Pupils: Pupils are equal, round, and reactive to light.   Cardiovascular:      Rate and Rhythm: Normal rate and regular rhythm.      Pulses: Normal pulses.      Heart sounds: Normal heart sounds.   Pulmonary:      Effort: Pulmonary effort is normal.      Breath sounds: Normal breath sounds.   Abdominal:      Tenderness: There is no abdominal tenderness. There is no right CVA tenderness or left CVA tenderness.      Comments: Positive soto sign   Musculoskeletal:         General: Normal range of motion.      Cervical back: Normal range of motion and neck supple.      Right lower leg: No edema.      Left lower leg: No edema.   Skin:     Capillary Refill: Capillary refill takes less than 2 seconds.   Neurological:      General: No focal deficit present.      Mental Status: He is alert and oriented to person, place, and time. Mental status is at baseline.   Psychiatric:         Mood and Affect: Mood normal.         Thought Content: Thought content normal.         Judgment: Judgment normal.       Fluids    Intake/Output Summary (Last 24 hours) at 12/27/2023 1033  Last data filed at 12/27/2023 0400  Gross per 24 hour    Intake 0 ml   Output 0 ml   Net 0 ml       Laboratory  Recent Labs     12/26/23  0427 12/27/23  0538   WBC 27.0* 17.8*   RBC 4.10* 4.08*   HEMOGLOBIN 11.5* 11.4*   HEMATOCRIT 35.0* 35.1*   MCV 85.4 86.0   MCH 28.0 27.9   MCHC 32.9 32.5   RDW 43.8 45.0   PLATELETCT 316 366   MPV 9.5 9.7       Recent Labs     12/26/23  0427 12/27/23  0538   SODIUM 135 137   POTASSIUM 3.4* 4.1   CHLORIDE 103 103   CO2 20 23   GLUCOSE 113* 109*   BUN 12 13   CREATININE 0.63 0.54   CALCIUM 8.0* 8.3*       Recent Labs     12/26/23  1408   INR 1.20*               Imaging  [id number] DUCTS   Final Result      Digitized intraoperative radiograph is submitted for review.  This examination is not for diagnostic purpose but for guidance during a surgical procedure. Please see the patient's chart for full procedural details.      OUTSIDE IMAGES-CT ABDOMEN /PELVIS   Final Result           Assessment/Plan  Problem Representation:    * Sepsis with acute liver failure without hepatic coma or septic shock (HCC)- (present on admission)  Assessment & Plan  This is Sepsis Present on admission  SIRS criteria identified on my evaluation include:   Fever, with temperature greater than 100.9 deg F, Tachycardia, with heart rate greater than 90 BPM, and Leukocytosis, with WBC greater than 12,000  Clinical indicators of end organ dysfunction include Acute Liver Failure, with bilirubin greater than 2  Source is cholangitis  Sepsis protocol initiated  Crystalloid Fluid Administration: At outside facility  IV antibiotics as appropriate for source of sepsis  Reassessment: I have reassessed the patient's hemodynamic status  Blood cultures drawn at outside facility, did not follow  Started on Zosyn 12/26/23    Acute biliary pancreatitis without infection or necrosis- (present on admission)  Assessment & Plan  Notes on CT abdomen on 12/25/23-Gallstone pancreatitis. Denies nausea, vomiting, diarrhea but c/o epigastric pain and tenderness on physical exam. No  personal hx of alcohol abuse except occasional drinking.    -S/p ERCP, continue IV fluid,  close hemodynamic and respiratory status monitoring. At risk of deterioration  -Gen surgery following appreciate recommendations and close follow up.      Cholangitis- (present on admission)  Assessment & Plan  Pt was transferred from outside facility after CT findings of cholangitis. Pt reported he started having symptoms of upper abdominal pain, fever 2months ago, noticed yellowing of his skin that brought his medical attention then. On physical exam Typical triad Fever, jaundice, RUQ pain, positive soto sign  was present. GI consult was sought on 12/26/23    -S/p ERCP  -appropriate pain regiment PRN  -Tylenol for fever  -Continue IV fluids, close monitoring of vitals, pt at risk of deterioration.  -Continue IV ZOSYN 12/26/23-  -Low fat low salt diet.  -Blood cultures to be verified from outside facility  -LFTs and billirubin trending down  -scheduled cholycystectomy today    Elevated LFTs  Assessment & Plan  Elevated LFTs in the setting of cholangitis.    See A&P for cholangitis  -daily CMP and close monitoring  -avoid hepatotoxic agents.      Neutrophilic leukocytosis  Assessment & Plan  Elevated leukocytes 27 with neutrophillic predominence 85% in the setting of cholangitis, sepsis(on admission)  -Trend on daily CBC with differential.  -see A&P for cholangitis    Hypokalemia  Assessment & Plan  Noted on this admission  -Replete as needed    Class 1 obesity due to excess calories with serious comorbidity and body mass index (BMI) of 31.0 to 31.9 in adult- (present on admission)  Assessment & Plan  BMI 31.84, patient was counselled on healthy diet and lifestyle and risk of cardiovascular diseases due to obesity.         VTE prophylaxis: SCDs/TEDs    I have performed a physical exam and reviewed and updated ROS and Plan today (12/27/2023). In review of yesterday's note (12/26/2023), there are no changes except as  documented above.

## 2023-12-27 NOTE — PROGRESS NOTES
Patient underwent ERCP and stone extraction yesterday feels clinically better today.  Chemically his numbers are improved.  He appears to be candidate to proceed to laparoscopic possibly open cholecystectomy.  The risk possible complications were carefully meticulously explained to him via .  Patient had an opportunity to have all questions answered.  Clearly his wrist for potential complications were elevated in the setting of chronic and acute inflammation.  Patient provided consent for surgery and anesthesia and will proceed

## 2023-12-27 NOTE — DIETARY
"Nutrition services: Day 1 of admit.  Sharath Gupta is a 53 y.o. male with admitting DX of cholangitis.    Consult received for wt loss (24-33 lbs in 1 month), poor PO per admit screen. Attempted to meet with pt at bedside multiple times, however pt was off the floor for ERCP yesterday and cholecystectomy today. Per chart review, pt has had intermittent abdominal pain for 2 months PTA. Poor PO likely related to this. No recent wt hx in chart to assess. RD to follow up with wt and PO hx as able.    Assessment:  Height: 165.1 cm (5' 5\")  Weight: 85.5 kg (188 lb 7.9 oz)  Body mass index is 31.37 kg/m²., BMI classification: obese class I  Diet/Intake: NPO    Evaluation:   Admitted with abdominal pain, choledocholithiasis.   PMH: none on file  Labs and meds reviewed.    Malnutrition Risk: Unable to determine at this time.    Recommendations/Plan:  Advance diet as tolerated per MD.  Encourage intake of meals.  Document intake of all meals as % taken in ADLs to provide interdisciplinary communication across all shifts.   Monitor weight.  Nutrition rep will continue to see patient for ongoing meal and snack preferences.     RD will follow per dept guidelines.    "

## 2023-12-27 NOTE — ANESTHESIA TIME REPORT
Anesthesia Start and Stop Event Times       Date Time Event    12/27/2023 1011 Ready for Procedure     1038 Anesthesia Start     1516 Anesthesia Stop          Responsible Staff  12/27/23      Name Role Begin End    Niki Casey M.D. Anesth 1038 1516          Overtime Reason:  no overtime (within assigned shift)    Comments:

## 2023-12-27 NOTE — ANESTHESIA PREPROCEDURE EVALUATION
Case: [id number] Date/Time: 12/27/23 0945    Procedure: CHOLECYSTECTOMY, LAPAROSCOPIC, POSSIBLE OPEN (Abdomen)    Anesthesia type: General    Location: [place] [address] / SURGERY [facility]    Surgeons: Derrick Meyers M.D.          52 yo w/cholangitis, gallstone pancreatitis s/p ERCP and acute cholecystitis.  Ipad  used.    Relevant Problems   ANESTHESIA (within normal limits)      Other   (positive) Acute biliary pancreatitis without infection or necrosis   (positive) Cholangitis   (positive) Elevated LFTs       Physical Exam    Airway   Mallampati: II  TM distance: >3 FB  Neck ROM: full       Cardiovascular   Rhythm: regular  Rate: normal  (-) murmur     Dental   Comments: Chip left upper front tooth         Pulmonary   Breath sounds clear to auscultation     Abdominal    Neurological - normal exam                   Anesthesia Plan    ASA 2       Plan - general       Airway plan will be ETT    (TAP block if procedure converted from laparoscopic to open)      Induction: intravenous    Postoperative Plan: Postoperative administration of opioids is intended.    Pertinent diagnostic labs and testing reviewed    Informed Consent:    Anesthetic plan and risks discussed with patient.    Use of blood products discussed with: patient whom consented to blood products.

## 2023-12-27 NOTE — OP REPORT
Date of Operation: December 27, 2023    Preoperative Diagnosis: Bile duct injury     Postoperative Diagnosis: Same    Operative Procedure: Mary-en-Y hepaticojejunostomy    Surgeon: Geovani    Assisting Surgeon:  Bernice    Anesthesia: General     Estimated Blood Loss: Per anesthesia    Specimens: None    Drains: Adrien drain x 1    Findings: Transection of the common hepatic duct just below hepatic bifurcation.  Intact right hepatic artery     Counts: Sponge, needle, and instrument counts were reported correct at the conclusion of the operation x2.       DESCRIPTION OF PROCEDURE   Was called to the operating room urgently for a laparoscopic cholecystectomy to that being converted to open.  There was concern for bile duct injury.  The operative field was inspected and the distal bile duct had been opened revealing an ERCP stent.  Proximally there appeared to be a transection of the common hepatic duct just below bifurcation.  Intraoperative cholangiogram was done and showed this to be so.    The patient ligament of Treitz was identified and and the jejunum transected with an [employer] LEOPOLDO stapler blue load 30 cm from the ligament of Treitz.  The mesentery was divided using clamps and 3-0 silk ties.  This allowed the length on our Mary limb.  An additional 40 cm downstream, a stapled side-to-side jejunojejunostomy was constructed using the same stapler load.  Common enterotomy was closed using running 3-0 PDS suture and interrupted 3-0 silk suture.    A mary limb was brought through the transverse colon mesentery and reached our hepatic duct without difficulty.  A parachute anastomosis was constructed using interrupted 4-0 PDS suture.  The anterior wall of the bile duct was first sewn leaving needles on the sutures.  This allowed better visualization of the hepatic duct.  A enterotomy was made in the jejunum and her back wall of sutures was placed sequentially.  These were then tied and the sutures cut.  Our anterior row  sutures was then brought back through the jejunum to complete our anastomosis.    The wound was then copiously irrigated and ensured after ensuring that adequate hemostasis had been obtained, the case was turned back over to Dr. Queen.       Complications: none noted

## 2023-12-27 NOTE — ANESTHESIA PROCEDURE NOTES
Airway    Date/Time: 12/27/2023 10:46 AM    Performed by: Niki Casey M.D.  Authorized by: Niki Casey M.D.    Location:  OR  Urgency:  Elective  Indications for Airway Management:  Anesthesia      Spontaneous Ventilation: absent    Sedation Level:  Deep  Preoxygenated: Yes    Patient Position:  Sniffing  Mask Difficulty Assessment:  1 - vent by mask  Final Airway Type:  Endotracheal airway  Final Endotracheal Airway:  ETT  Cuffed: Yes    Technique Used for Successful ETT Placement:  Direct laryngoscopy  Devices/Methods Used in Placement:  Intubating stylet    Insertion Site:  Oral  Blade Type:  Mikaela  Laryngoscope Blade/Videolaryngoscope Blade Size:  3  ETT Size (mm):  7.5  Measured from:  Teeth  ETT to Teeth (cm):  21  Placement Verified by: capnometry and palpation of cuff    Cormack-Lehane Classification:  Grade IIa - partial view of glottis  Number of Attempts at Approach:  1

## 2023-12-27 NOTE — PROCEDURES
Preoperative diagnosis acute cholecystitis with choledocholithiasis chronic and acute  Postoperative diagnosis same  Operation laparoscopic cholecystectomy converted to open cholecystectomy with hepaticojejunostomy for proximal common bile duct injury near the hepatic bifurcation  Surgeons;charli and Geovani  Anesthesia Dr. Casey  Assistant Breanna Beasley  An assistant was required for the safe completion of the surgical case.  The assistant provided retraction, exposure, performed wound closure and assisted with instrumentation promoting the efficiency of the surgery performed.  I felt an assistant was necessary in the interest of safety and efficiency.  My request for assistance is based on my training and experience and should be patently obvious to the most casual observer as necessary.  Wound classification contaminated  Findings severe chronic inflammatory changes around the gallbladder with dense adhesions from the colon duodenum and hepatoduodenal ligament patient had a injury to the hepatic bifurcation of the common bile duct and Dr. Gorman and was called in for hepaticojejunostomy repair  Estimated blood loss 250  Counts correct x 2 LILIAN drain x 1    Pathology specimens 1  Operative note  This 53-year-old male has been chronically ill with cholecystitis.  He presented with obstructive jaundice marked leukocytosis and gallstone pancreatitis.  He underwent an early ERCP and had stone removal and stent placement.  He did clinically improve in terms of his examination and chemistries overnight to the point where he was felt to be a candidate for cholecystectomy today the risk possible complications of operation were carefully explained to the patient in detail and these were amplified due to the chronicity of his illness and extensive inflammatory changes seen on imaging.  Patient provided consent to proceed was taken to the operating room placed under anesthesia once anesthetized his abdomen was prepped  with ChloraPrep solution and sterile drapes were applied a timeout was affected a solution of half percent Marcaine with epinephrine liberally infiltrated in all incisions.  Antibiotics and sequential stockings had been administered and applied.  An incision was made in the supraumbilical location and a Veress needle was inserted saline drop test was permissive to proceed with step pneumo insufflation once fully pneumo insufflated a 12 mm trocar was placed this facilitated video laparoscopy there is no evidence of injury related to entry.  Patient had considerable inflammatory changes in the upper abdomen with dense adherence of the omentum to the fundus of the gallbladder.  A 12 mm trocars placed in the epigastrium two 5 mm trocars placed in the right upper quadrant.Taken to the recovery room in stable and satisfactory condition.  Patient had the gallbladder mobilized.  The patient had very dense adhesions of the omentum to the fundus.  These were gradually taken down using a combination of sharp dissection and blunt dissection and electrocautery.  The transverse colon was found to be adhesed to the infundibulum and this was gradually .  The colon was inspected not noted to have perforation but there was extensive intimacy in this dissection.  As we progressed down along the infundibulum the patient's duodenum presented itself and also was densely adherent to the anterior aspect of the duodenum.  I could not free that due to the extensive inflammation present.  At this point we elected to proceed with open conversion.  The laparoscopic equipment was removed a right subcostal incision was made with electrocautery down through skin subcutaneous tissue of the anterior rectus sheath rectus muscle and posterior rectus sheath.  Fixed retractors were placed in the right upper quadrant.  The hematoma was evacuated the duodenum was mobilized using sharp dissection off of the infundibulum of the gallbladder.  The  colon and duodenum were carefully inspected and found to be free of injury.  We had difficulty defining the anatomy due to chronic inflammation in the triangle of MICHAEL and the hepatoduodenal ligament.  A Kocher maneuver was done to get behind the hepatoduodenal ligament.  The safest approach was felt now to be retrograde in terms of the gallbladder mobilization.  The gallbladder was gradually  from the gallbladder fossa.  There was extensive inflammatory change here.  This was gradually freed down onto the area around the infundibulum.  The triangle of MICHAEL was very chronically scarred.  We are gradually able to mobilize that dissecting with right angles and Metzenbaum scissors.  The cystic duct was ultimately identified.  This was mobilized and then ligated using an 0 silk suture.  The peritoneum overlying the triangle of MICHAEL was scored using electrocautery.  We attempted to separate the infundibulum from the hepatoduodenal ligament and identified the cystic artery.  This proved to be very arduous.  Ultimately this was freed and the gallbladder was submitted to pathology for permanent section.  We reinspected the hepatoduodenal ligament and found a bile leak in the subhepatic space.  Visualizing this it looked as if there might be an anterior injury to the right hepatic duct.  I took off the suture from the cystic duct where it had been ligated.  This proved in fact to be the cystic duct.  We could identify the stent in the common bile duct and there was persistent common bile duct going proximally into the hilum of the liver.  I felt at this point that potentially that fenestration in the duct could be repaired with a T-tube but felt that intraoperative consultation would be preferable and Dr. Bernardo was available and rapidly appeared to evaluate.  Cholangiograms were subsequently performed and when done through the choledochotomy we could see the common bile duct go up and then a leak without defining the  proximal radicles other than partial visualization of the right hepatic duct.  When done through the whole near the bifurcation we could demonstrate the right hepatic duct and a branch to one of the hepatic segments quite well.  The left hepatic duct did visualize but this was somewhat below the balloon.  I did see some retrograde filling of the common bile duct.  I think this was a lateral but complex injury.  I agreed with Dr. Olivo that the best approach was to do hepaticojejunostomy.  This was done Orquidea-en-Y with the jejunostomy portion being brought up retrocolic by Dr Olivo in side-to-side fashion using meticulously placed interrupted 4-0 PDS sutures.  He will dictate his portion of the procedure separately.  When the anastomosis was constructed a look quite good.  The procedure was then completed with irrigation of the subhepatic space hemostasis was assured.  A 19 [place] Bard channel drain was placed around the hepaticojejunostomy in the subhepatic space brought out there was separate lateral stab incision sewn in place using nylon suture.  Soft counts were correct.  Omentum was packed around the hepaticojejunostomy.  The abdomen was then closed in 2 layers using running oh strata fix suture in the fascia the subcutaneous tissues were irrigated skin was closed using automatic stapling device and sterile dressings were applied the drain was connected to bulb suction.  Estimated blood loss was 250 cc sponge needle counts reported as correct.  Patient had an unfortunate but well repaired intraoperative complication to the bile ducts.  He did have extensive inflammation will be held for postoperative care and management gallbladder was sent for pathology for permanent section

## 2023-12-28 ENCOUNTER — HOSPITAL ENCOUNTER (OUTPATIENT)
Facility: MEDICAL CENTER | Age: 53
End: 2023-12-28
Attending: INTERNAL MEDICINE | Admitting: INTERNAL MEDICINE
Payer: MEDICAID

## 2023-12-28 ENCOUNTER — APPOINTMENT (OUTPATIENT)
Dept: RADIOLOGY | Facility: MEDICAL CENTER | Age: 53
DRG: 853 | End: 2023-12-28
Attending: GENERAL PRACTICE

## 2023-12-28 ENCOUNTER — TELEPHONE (OUTPATIENT)
Dept: SURGERY | Facility: MEDICAL CENTER | Age: 53
End: 2023-12-28

## 2023-12-28 LAB
ALBUMIN SERPL BCP-MCNC: 2.4 G/DL (ref 3.2–4.9)
ALBUMIN/GLOB SERPL: 0.7 G/DL
ALP SERPL-CCNC: 251 U/L (ref 30–99)
ALT SERPL-CCNC: 136 U/L (ref 2–50)
ANION GAP SERPL CALC-SCNC: 10 MMOL/L (ref 7–16)
APPEARANCE UR: CLEAR
AST SERPL-CCNC: 92 U/L (ref 12–45)
BASOPHILS # BLD AUTO: 0.2 % (ref 0–1.8)
BASOPHILS # BLD: 0.03 K/UL (ref 0–0.12)
BILIRUB SERPL-MCNC: 1.4 MG/DL (ref 0.1–1.5)
BILIRUB UR QL STRIP.AUTO: NEGATIVE
BUN SERPL-MCNC: 13 MG/DL (ref 8–22)
CALCIUM ALBUM COR SERPL-MCNC: 9.2 MG/DL (ref 8.5–10.5)
CALCIUM SERPL-MCNC: 7.9 MG/DL (ref 8.5–10.5)
CHLORIDE SERPL-SCNC: 102 MMOL/L (ref 96–112)
CO2 SERPL-SCNC: 25 MMOL/L (ref 20–33)
COLOR UR: YELLOW
CREAT SERPL-MCNC: 0.58 MG/DL (ref 0.5–1.4)
EOSINOPHIL # BLD AUTO: 0.02 K/UL (ref 0–0.51)
EOSINOPHIL NFR BLD: 0.1 % (ref 0–6.9)
ERYTHROCYTE [DISTWIDTH] IN BLOOD BY AUTOMATED COUNT: 45.5 FL (ref 35.9–50)
GFR SERPLBLD CREATININE-BSD FMLA CKD-EPI: 117 ML/MIN/1.73 M 2
GLOBULIN SER CALC-MCNC: 3.4 G/DL (ref 1.9–3.5)
GLUCOSE SERPL-MCNC: 123 MG/DL (ref 65–99)
GLUCOSE UR STRIP.AUTO-MCNC: NEGATIVE MG/DL
HCT VFR BLD AUTO: 33.6 % (ref 42–52)
HGB BLD-MCNC: 11.1 G/DL (ref 14–18)
IMM GRANULOCYTES # BLD AUTO: 0.1 K/UL (ref 0–0.11)
IMM GRANULOCYTES NFR BLD AUTO: 0.6 % (ref 0–0.9)
KETONES UR STRIP.AUTO-MCNC: ABNORMAL MG/DL
LEUKOCYTE ESTERASE UR QL STRIP.AUTO: NEGATIVE
LIPASE SERPL-CCNC: 77 U/L (ref 11–82)
LYMPHOCYTES # BLD AUTO: 2.22 K/UL (ref 1–4.8)
LYMPHOCYTES NFR BLD: 12.5 % (ref 22–41)
MCH RBC QN AUTO: 28.2 PG (ref 27–33)
MCHC RBC AUTO-ENTMCNC: 33 G/DL (ref 32.3–36.5)
MCV RBC AUTO: 85.5 FL (ref 81.4–97.8)
MICRO URNS: ABNORMAL
MONOCYTES # BLD AUTO: 1.51 K/UL (ref 0–0.85)
MONOCYTES NFR BLD AUTO: 8.5 % (ref 0–13.4)
NEUTROPHILS # BLD AUTO: 13.88 K/UL (ref 1.82–7.42)
NEUTROPHILS NFR BLD: 78.1 % (ref 44–72)
NITRITE UR QL STRIP.AUTO: NEGATIVE
NRBC # BLD AUTO: 0 K/UL
NRBC BLD-RTO: 0 /100 WBC (ref 0–0.2)
PH UR STRIP.AUTO: 5.5 [PH] (ref 5–8)
PLATELET # BLD AUTO: 412 K/UL (ref 164–446)
PMV BLD AUTO: 9.4 FL (ref 9–12.9)
POTASSIUM SERPL-SCNC: 4.3 MMOL/L (ref 3.6–5.5)
PROT SERPL-MCNC: 5.8 G/DL (ref 6–8.2)
PROT UR QL STRIP: NEGATIVE MG/DL
RBC # BLD AUTO: 3.93 M/UL (ref 4.7–6.1)
RBC UR QL AUTO: NEGATIVE
SODIUM SERPL-SCNC: 137 MMOL/L (ref 135–145)
SP GR UR STRIP.AUTO: 1.02
UROBILINOGEN UR STRIP.AUTO-MCNC: 0.2 MG/DL
WBC # BLD AUTO: 17.8 K/UL (ref 4.8–10.8)

## 2023-12-28 PROCEDURE — 83690 ASSAY OF LIPASE: CPT

## 2023-12-28 PROCEDURE — 700102 HCHG RX REV CODE 250 W/ 637 OVERRIDE(OP): Performed by: SPECIALIST

## 2023-12-28 PROCEDURE — 99232 SBSQ HOSP IP/OBS MODERATE 35: CPT | Performed by: NURSE PRACTITIONER

## 2023-12-28 PROCEDURE — A9270 NON-COVERED ITEM OR SERVICE: HCPCS | Performed by: GENERAL PRACTICE

## 2023-12-28 PROCEDURE — 51798 US URINE CAPACITY MEASURE: CPT

## 2023-12-28 PROCEDURE — 700102 HCHG RX REV CODE 250 W/ 637 OVERRIDE(OP): Performed by: NURSE PRACTITIONER

## 2023-12-28 PROCEDURE — 99024 POSTOP FOLLOW-UP VISIT: CPT | Performed by: NURSE PRACTITIONER

## 2023-12-28 PROCEDURE — 770001 HCHG ROOM/CARE - MED/SURG/GYN PRIV*

## 2023-12-28 PROCEDURE — 700111 HCHG RX REV CODE 636 W/ 250 OVERRIDE (IP): Performed by: GENERAL PRACTICE

## 2023-12-28 PROCEDURE — 700105 HCHG RX REV CODE 258: Performed by: STUDENT IN AN ORGANIZED HEALTH CARE EDUCATION/TRAINING PROGRAM

## 2023-12-28 PROCEDURE — 99233 SBSQ HOSP IP/OBS HIGH 50: CPT | Performed by: GENERAL PRACTICE

## 2023-12-28 PROCEDURE — 700111 HCHG RX REV CODE 636 W/ 250 OVERRIDE (IP): Mod: JZ | Performed by: STUDENT IN AN ORGANIZED HEALTH CARE EDUCATION/TRAINING PROGRAM

## 2023-12-28 PROCEDURE — 74018 RADEX ABDOMEN 1 VIEW: CPT

## 2023-12-28 PROCEDURE — 700105 HCHG RX REV CODE 258: Performed by: NURSE PRACTITIONER

## 2023-12-28 PROCEDURE — 80053 COMPREHEN METABOLIC PANEL: CPT

## 2023-12-28 PROCEDURE — A9270 NON-COVERED ITEM OR SERVICE: HCPCS | Performed by: SPECIALIST

## 2023-12-28 PROCEDURE — A9270 NON-COVERED ITEM OR SERVICE: HCPCS | Performed by: STUDENT IN AN ORGANIZED HEALTH CARE EDUCATION/TRAINING PROGRAM

## 2023-12-28 PROCEDURE — 700111 HCHG RX REV CODE 636 W/ 250 OVERRIDE (IP): Performed by: NURSE PRACTITIONER

## 2023-12-28 PROCEDURE — A9270 NON-COVERED ITEM OR SERVICE: HCPCS | Performed by: NURSE PRACTITIONER

## 2023-12-28 PROCEDURE — 700102 HCHG RX REV CODE 250 W/ 637 OVERRIDE(OP): Performed by: STUDENT IN AN ORGANIZED HEALTH CARE EDUCATION/TRAINING PROGRAM

## 2023-12-28 PROCEDURE — 81003 URINALYSIS AUTO W/O SCOPE: CPT

## 2023-12-28 PROCEDURE — 85025 COMPLETE CBC W/AUTO DIFF WBC: CPT

## 2023-12-28 PROCEDURE — 700102 HCHG RX REV CODE 250 W/ 637 OVERRIDE(OP): Performed by: GENERAL PRACTICE

## 2023-12-28 PROCEDURE — 87086 URINE CULTURE/COLONY COUNT: CPT

## 2023-12-28 PROCEDURE — 36415 COLL VENOUS BLD VENIPUNCTURE: CPT

## 2023-12-28 PROCEDURE — 99024 POSTOP FOLLOW-UP VISIT: CPT | Performed by: SPECIALIST

## 2023-12-28 RX ORDER — BENZONATATE 100 MG/1
100 CAPSULE ORAL 3 TIMES DAILY PRN
Status: DISCONTINUED | OUTPATIENT
Start: 2023-12-28 | End: 2023-12-30 | Stop reason: HOSPADM

## 2023-12-28 RX ORDER — FAMOTIDINE 20 MG/1
20 TABLET, FILM COATED ORAL 2 TIMES DAILY
Status: DISCONTINUED | OUTPATIENT
Start: 2023-12-28 | End: 2023-12-30 | Stop reason: HOSPADM

## 2023-12-28 RX ORDER — OMEPRAZOLE 20 MG/1
20 CAPSULE, DELAYED RELEASE ORAL EVERY 12 HOURS
Status: DISCONTINUED | OUTPATIENT
Start: 2023-12-28 | End: 2023-12-28

## 2023-12-28 RX ORDER — CALCIUM CARBONATE 500 MG/1
500 TABLET, CHEWABLE ORAL DAILY
Status: DISCONTINUED | OUTPATIENT
Start: 2023-12-28 | End: 2023-12-28

## 2023-12-28 RX ORDER — PROCHLORPERAZINE EDISYLATE 5 MG/ML
10 INJECTION INTRAMUSCULAR; INTRAVENOUS EVERY 6 HOURS PRN
Status: DISCONTINUED | OUTPATIENT
Start: 2023-12-28 | End: 2023-12-28

## 2023-12-28 RX ORDER — GABAPENTIN 100 MG/1
100 CAPSULE ORAL 3 TIMES DAILY
Status: DISCONTINUED | OUTPATIENT
Start: 2023-12-28 | End: 2023-12-30 | Stop reason: HOSPADM

## 2023-12-28 RX ORDER — ACETAMINOPHEN 500 MG
1000 TABLET ORAL EVERY 6 HOURS
Status: DISCONTINUED | OUTPATIENT
Start: 2023-12-28 | End: 2023-12-30 | Stop reason: HOSPADM

## 2023-12-28 RX ORDER — ENOXAPARIN SODIUM 100 MG/ML
40 INJECTION SUBCUTANEOUS DAILY
Status: DISCONTINUED | OUTPATIENT
Start: 2023-12-28 | End: 2023-12-30 | Stop reason: HOSPADM

## 2023-12-28 RX ORDER — ACETAMINOPHEN 500 MG
1000 TABLET ORAL EVERY 6 HOURS PRN
Status: DISCONTINUED | OUTPATIENT
Start: 2024-01-02 | End: 2023-12-30 | Stop reason: HOSPADM

## 2023-12-28 RX ORDER — HYDROMORPHONE HYDROCHLORIDE 1 MG/ML
0.5 INJECTION, SOLUTION INTRAMUSCULAR; INTRAVENOUS; SUBCUTANEOUS
Status: DISCONTINUED | OUTPATIENT
Start: 2023-12-28 | End: 2023-12-30 | Stop reason: HOSPADM

## 2023-12-28 RX ORDER — OXYCODONE HYDROCHLORIDE 5 MG/1
5 TABLET ORAL
Status: DISCONTINUED | OUTPATIENT
Start: 2023-12-28 | End: 2023-12-30 | Stop reason: HOSPADM

## 2023-12-28 RX ORDER — PROCHLORPERAZINE EDISYLATE 5 MG/ML
10 INJECTION INTRAMUSCULAR; INTRAVENOUS EVERY 6 HOURS PRN
Status: DISCONTINUED | OUTPATIENT
Start: 2023-12-28 | End: 2023-12-30 | Stop reason: HOSPADM

## 2023-12-28 RX ORDER — ONDANSETRON 2 MG/ML
4 INJECTION INTRAMUSCULAR; INTRAVENOUS EVERY 4 HOURS PRN
Status: DISCONTINUED | OUTPATIENT
Start: 2023-12-28 | End: 2023-12-30 | Stop reason: HOSPADM

## 2023-12-28 RX ORDER — OXYCODONE HYDROCHLORIDE 10 MG/1
10 TABLET ORAL
Status: DISCONTINUED | OUTPATIENT
Start: 2023-12-28 | End: 2023-12-30 | Stop reason: HOSPADM

## 2023-12-28 RX ADMIN — PIPERACILLIN AND TAZOBACTAM 3.38 G: 3; .375 INJECTION, POWDER, FOR SOLUTION INTRAVENOUS at 06:28

## 2023-12-28 RX ADMIN — OXYCODONE HYDROCHLORIDE 10 MG: 10 TABLET ORAL at 08:26

## 2023-12-28 RX ADMIN — GABAPENTIN 100 MG: 100 CAPSULE ORAL at 08:26

## 2023-12-28 RX ADMIN — METHOCARBAMOL 1000 MG: 100 INJECTION, SOLUTION INTRAMUSCULAR; INTRAVENOUS at 22:28

## 2023-12-28 RX ADMIN — ACETAMINOPHEN 1000 MG: 500 TABLET ORAL at 08:26

## 2023-12-28 RX ADMIN — PIPERACILLIN AND TAZOBACTAM 3.38 G: 3; .375 INJECTION, POWDER, FOR SOLUTION INTRAVENOUS at 15:53

## 2023-12-28 RX ADMIN — FAMOTIDINE 20 MG: 20 TABLET ORAL at 10:02

## 2023-12-28 RX ADMIN — ENOXAPARIN SODIUM 40 MG: 100 INJECTION SUBCUTANEOUS at 20:06

## 2023-12-28 RX ADMIN — SODIUM CHLORIDE, POTASSIUM CHLORIDE, SODIUM LACTATE AND CALCIUM CHLORIDE: 600; 310; 30; 20 INJECTION, SOLUTION INTRAVENOUS at 01:05

## 2023-12-28 RX ADMIN — ACETAMINOPHEN 1000 MG: 500 TABLET ORAL at 20:07

## 2023-12-28 RX ADMIN — GABAPENTIN 100 MG: 100 CAPSULE ORAL at 12:25

## 2023-12-28 RX ADMIN — FAMOTIDINE 20 MG: 20 TABLET ORAL at 20:07

## 2023-12-28 RX ADMIN — DOCUSATE SODIUM 50 MG AND SENNOSIDES 8.6 MG 2 TABLET: 8.6; 5 TABLET, FILM COATED ORAL at 06:26

## 2023-12-28 RX ADMIN — HYDROMORPHONE HYDROCHLORIDE 0.5 MG: 1 INJECTION, SOLUTION INTRAMUSCULAR; INTRAVENOUS; SUBCUTANEOUS at 10:05

## 2023-12-28 RX ADMIN — ACETAMINOPHEN 1000 MG: 500 TABLET ORAL at 15:53

## 2023-12-28 RX ADMIN — PIPERACILLIN AND TAZOBACTAM 3.38 G: 3; .375 INJECTION, POWDER, FOR SOLUTION INTRAVENOUS at 23:16

## 2023-12-28 RX ADMIN — GABAPENTIN 100 MG: 100 CAPSULE ORAL at 20:07

## 2023-12-28 RX ADMIN — OXYCODONE HYDROCHLORIDE 10 MG: 10 TABLET ORAL at 12:25

## 2023-12-28 RX ADMIN — SODIUM CHLORIDE, POTASSIUM CHLORIDE, SODIUM LACTATE AND CALCIUM CHLORIDE: 600; 310; 30; 20 INJECTION, SOLUTION INTRAVENOUS at 06:34

## 2023-12-28 RX ADMIN — TRAMADOL HYDROCHLORIDE 50 MG: 50 TABLET ORAL at 06:30

## 2023-12-28 RX ADMIN — DOCUSATE SODIUM 50 MG AND SENNOSIDES 8.6 MG 2 TABLET: 8.6; 5 TABLET, FILM COATED ORAL at 20:06

## 2023-12-28 ASSESSMENT — ENCOUNTER SYMPTOMS
PSYCHIATRIC NEGATIVE: 1
HEARTBURN: 1
CONSTIPATION: 0
CONSTITUTIONAL NEGATIVE: 1
HEADACHES: 0
BLOOD IN STOOL: 0
SHORTNESS OF BREATH: 0
WEAKNESS: 0
NERVOUS/ANXIOUS: 0
MUSCULOSKELETAL NEGATIVE: 1
INSOMNIA: 0
MYALGIAS: 0
DIARRHEA: 0
ABDOMINAL PAIN: 1
FEVER: 0
SORE THROAT: 0
NAUSEA: 0
FLANK PAIN: 0
RESPIRATORY NEGATIVE: 1
VOMITING: 0
FALLS: 0
COUGH: 0
ROS GI COMMENTS: BM PTA
CHILLS: 0

## 2023-12-28 ASSESSMENT — PAIN DESCRIPTION - PAIN TYPE: TYPE: SURGICAL PAIN

## 2023-12-28 NOTE — PROGRESS NOTES
Bedside report received.  Assessment complete.  A&O x 4. Patient calls appropriately.  Patient ambulates with SB assist.    Patient has 9/10 pain. Pain managed with prescribed medications.  Denies N&V. Tolerating clear liquid diet. Patient complains of heartburn   RUQ incision with island dressing, CDI. RLQ LILIAN drain with dressing, CDI. X 3 lap sites with dressing, CDI  + void, - flatus, - BM.  Patient denies SOB.  SCD's refused.  Patient primarily Citizen of the [place] speaking .  Review plan with of care with patient. Call light and personal belongings within reach. Hourly rounding in place. All needs met at this time.

## 2023-12-28 NOTE — PROGRESS NOTES
Gastroenterology Progress Note               Author:  ROLAND Jimenez Date & Time Created: 12/28/2023 2:25 PM       Patient ID:  Name:             Sharath Gupta    YOB: 1970  Age:                 53 y.o.  male  MRN:               [id number]        Medical Decision Making, by Problem:  Active Hospital Problems    Diagnosis     Sepsis with acute liver failure without hepatic coma or septic shock (HCC) [A41.9, R65.20, K72.00]     Cholangitis [K83.09]     Class 1 obesity due to excess calories with body mass index (BMI) of 31.0 to 31.9 in adult [E66.09, Z68.31]     Neutrophilic leukocytosis [D72.9]     Elevated LFTs [R79.89]     Hypokalemia [E87.6]     Acute biliary pancreatitis without infection or necrosis [K85.10]            Presenting Chief Complaint:  Ascending cholangitis, abnormal CT scan     History of Present Illness:   53-year-old male transferred for presumed ascending cholangitis.  Patient having right upper quadrant abdominal pain.  No significant past medical history.  Presents to outside facility.  He was tachycardic and had a white count of 14,000.  He had a lipase of 6000 and an obstructive biochemical liver test pattern.  He underwent cross-sectional imaging.  He was transferred here.  He is on Zosyn.  His fevers have defervesced.  He is feeling somewhat better but still having right upper quadrant pain.  He is NPO.            Interval History:  12/28/2023: Patient postprocedure day 2 s/p ERCP, postop day 1 s/p laparoscopic cholecystectomy converted to open cholecystectomy with hepaticojejunostomy for proximal common bile duct injury near hepatic bifurcation.   Sophie [id number] utilized.  Patient reports right upper quadrant abdominal pain.  States pain with drinking cranberry juice as well as water.  Hemoglobin stable.  Leukocytosis stable at 17.8, LFTs continue to downtrend, total bilirubin 1.4.        Hospital Medications:  Current Facility-Administered  Medications   Medication Dose Frequency Provider Last Rate Last Admin    Pharmacy Consult Request ...Pain Management Review 1 Each  1 Each PHARMACY TO DOSE Irina Solis D.O.        oxyCODONE immediate-release (Roxicodone) tablet 5 mg  5 mg Q3HRS PRN ZARINA Recio.O.        Or    oxyCODONE immediate release (Roxicodone) tablet 10 mg  10 mg Q3HRS PRN ZARINA Recio.O.   10 mg at 12/28/23 1225    Or    HYDROmorphone (Dilaudid) injection 0.5 mg  0.5 mg Q3HRS PRN Irina Solis D.O.   0.5 mg at 12/28/23 1005    ondansetron (Zofran) syringe/vial injection 4 mg  4 mg Q4HRS PRN ZARINA Recio.O.        acetaminophen (Tylenol) tablet 1,000 mg  1,000 mg Q6HRS Breanna Beasley, A.P.N.   1,000 mg at 12/28/23 0826    Followed by    [START ON 1/2/2024] acetaminophen (Tylenol) tablet 1,000 mg  1,000 mg Q6HRS PRN Breanna Beasley, A.P.N.        gabapentin (Neurontin) capsule 100 mg  100 mg TID Breanna Beasley, A.P.N.   100 mg at 12/28/23 1225    enoxaparin (Lovenox) inj 40 mg  40 mg DAILY AT 1800 Breanna Beasley, A.P.N.        prochlorperazine (Compazine) injection 10 mg  10 mg Q6HRS PRN ZARINA Recio.O.        famotidine (Pepcid) tablet 20 mg  20 mg BID ROSANGELA Lacy.A.-C.   20 mg at 12/28/23 1002    benzonatate (Tessalon) capsule 100 mg  100 mg TID PRN ZARINA Recio.O.        senna-docusate (Pericolace Or Senokot S) 8.6-50 MG per tablet 2 Tablet  2 Tablet BID Munadel CARROLL Sheldon MLoveDLove   2 Tablet at 12/28/23 0626    And    polyethylene glycol/lytes (Miralax) Packet 1 Packet  1 Packet QDAY PRN Munadel A Monalisa, M.D.        And    magnesium hydroxide (Milk Of Magnesia) suspension 30 mL  30 mL QDAY PRN Raffy Sheldon M.D.        And    bisacodyl (Dulcolax) suppository 10 mg  10 mg QDAY PRN Raffy Sheldon M.D.        piperacillin-tazobactam (Zosyn) 3.375 g in  mL IVPB  3.375 g Q8HRS Raffy Sheldon M.D.   Stopped at 12/28/23 1028    labetalol (Normodyne/Trandate) injection 10 mg  10 mg Q4HRS PRN Rafyf HODGES  "ORA Sheldon       Last reviewed on 12/27/2023  9:33 AM by Ashleigh Robles R.N.       Review of Systems:  Review of Systems   Constitutional:  Positive for malaise/fatigue. Negative for chills and fever.   HENT:  Negative for congestion and sore throat.    Respiratory:  Negative for cough and shortness of breath.    Cardiovascular:  Negative for chest pain and leg swelling.   Gastrointestinal:  Positive for abdominal pain. Negative for blood in stool, constipation, diarrhea, melena, nausea and vomiting.   Genitourinary:  Negative for dysuria and flank pain.   Musculoskeletal:  Negative for falls and myalgias.   Neurological:  Negative for weakness and headaches.   Psychiatric/Behavioral:  The patient is not nervous/anxious and does not have insomnia.    All other systems reviewed and are negative.        Vital signs:  Weight/BMI: Body mass index is 31.37 kg/m².  /69   Pulse (!) 102   Temp 36.8 °C (98.2 °F) (Temporal)   Resp 17   Ht 1.651 m (5' 5\")   Wt 85.5 kg (188 lb 7.9 oz)   SpO2 89%   Vitals:    12/27/23 2346 12/28/23 0516 12/28/23 0705 12/28/23 1105   BP: 117/68 124/69 130/78 110/69   Pulse: 90 93 97 (!) 102   Resp: 16 16 17 17   Temp: 36.7 °C (98.1 °F) 37.3 °C (99.1 °F) 37.5 °C (99.5 °F) 36.8 °C (98.2 °F)   TempSrc: Temporal Temporal Temporal Temporal   SpO2: 90%  88% 89%   Weight:       Height:         Oxygen Therapy:  Pulse Oximetry: 89 %, O2 (LPM): 0, O2 Delivery Device: None - Room Air    Intake/Output Summary (Last 24 hours) at 12/28/2023 1425  Last data filed at 12/28/2023 1105  Gross per 24 hour   Intake 3021.72 ml   Output 1195 ml   Net 1826.72 ml         Physical Exam:  Physical Exam  Vitals and nursing note reviewed.   Constitutional:       General: He is not in acute distress.     Appearance: He is obese.   HENT:      Head: Normocephalic.      Nose: Nose normal. No congestion.      Mouth/Throat:      Mouth: Mucous membranes are moist.      Pharynx: Oropharynx is clear. No oropharyngeal " exudate.   Eyes:      General: No scleral icterus.     Extraocular Movements: Extraocular movements intact.      Conjunctiva/sclera: Conjunctivae normal.   Cardiovascular:      Rate and Rhythm: Normal rate and regular rhythm.      Pulses: Normal pulses.      Heart sounds: Normal heart sounds. No murmur heard.  Pulmonary:      Effort: Pulmonary effort is normal. No respiratory distress.      Breath sounds: Normal breath sounds. No wheezing.   Abdominal:      General: Abdomen is flat. Bowel sounds are normal. There is no distension.      Palpations: Abdomen is soft.      Tenderness: There is abdominal tenderness.      Comments: Surgical dressings to the right upper quadrant, staples to periumbilical area.  No surrounding erythema, edema, drainage  LILIAN drain right upper quadrant with bilious output.   Musculoskeletal:      Right lower leg: No edema.      Left lower leg: No edema.   Skin:     General: Skin is warm and dry.      Capillary Refill: Capillary refill takes less than 2 seconds.      Coloration: Skin is not jaundiced.   Neurological:      General: No focal deficit present.      Mental Status: He is alert. Mental status is at baseline. He is disoriented.      Motor: No weakness.   Psychiatric:         Mood and Affect: Mood normal.         Behavior: Behavior normal.         Thought Content: Thought content normal.         Judgment: Judgment normal.             Labs:  Recent Labs     12/26/23 0427 12/27/23  0538 12/28/23  0330   SODIUM 135 137 137   POTASSIUM 3.4* 4.1 4.3   CHLORIDE 103 103 102   CO2 20 23 25   BUN 12 13 13   CREATININE 0.63 0.54 0.58   MAGNESIUM 1.9  --   --    CALCIUM 8.0* 8.3* 7.9*     Recent Labs     12/26/23 0427 12/27/23  0538 12/28/23  0330   ALTSGPT 142* 97* 136*   ASTSGOT 87* 33 92*   ALKPHOSPHAT 466* 364* 251*   TBILIRUBIN 6.1* 1.7* 1.4   LIPASE  --  533* 77   GLUCOSE 113* 109* 123*     Recent Labs     12/26/23 0427 12/27/23  0538 12/28/23  0330   WBC 27.0* 17.8* 17.8*   NEUTSPOLYS  85.30* 85.30* 78.10*   LYMPHOCYTES 6.30* 9.90* 12.50*   MONOCYTES 7.30 4.00 8.50   EOSINOPHILS 0.10 0.00 0.10   BASOPHILS 0.30 0.20 0.20   ASTSGOT 87* 33 92*   ALTSGPT 142* 97* 136*   ALKPHOSPHAT 466* 364* 251*   TBILIRUBIN 6.1* 1.7* 1.4     Recent Labs     12/26/23  0427 12/26/23  1408 12/27/23  0538 12/28/23  0330   RBC 4.10*  --  4.08* 3.93*   HEMOGLOBIN 11.5*  --  11.4* 11.1*   HEMATOCRIT 35.0*  --  35.1* 33.6*   PLATELETCT 316  --  366 412   PROTHROMBTM  --  15.4*  --   --    INR  --  1.20*  --   --      Recent Results (from the past 24 hour(s))   CBC with Differential    Collection Time: 12/28/23  3:30 AM   Result Value Ref Range    WBC 17.8 (H) 4.8 - 10.8 K/uL    RBC 3.93 (L) 4.70 - 6.10 M/uL    Hemoglobin 11.1 (L) 14.0 - 18.0 g/dL    Hematocrit 33.6 (L) 42.0 - 52.0 %    MCV 85.5 81.4 - 97.8 fL    MCH 28.2 27.0 - 33.0 pg    MCHC 33.0 32.3 - 36.5 g/dL    RDW 45.5 35.9 - 50.0 fL    Platelet Count 412 164 - 446 K/uL    MPV 9.4 9.0 - 12.9 fL    Neutrophils-Polys 78.10 (H) 44.00 - 72.00 %    Lymphocytes 12.50 (L) 22.00 - 41.00 %    Monocytes 8.50 0.00 - 13.40 %    Eosinophils 0.10 0.00 - 6.90 %    Basophils 0.20 0.00 - 1.80 %    Immature Granulocytes 0.60 0.00 - 0.90 %    Nucleated RBC 0.00 0.00 - 0.20 /100 WBC    Neutrophils (Absolute) 13.88 (H) 1.82 - 7.42 K/uL    Lymphs (Absolute) 2.22 1.00 - 4.80 K/uL    Monos (Absolute) 1.51 (H) 0.00 - 0.85 K/uL    Eos (Absolute) 0.02 0.00 - 0.51 K/uL    Baso (Absolute) 0.03 0.00 - 0.12 K/uL    Immature Granulocytes (abs) 0.10 0.00 - 0.11 K/uL    NRBC (Absolute) 0.00 K/uL   Comp Metabolic Panel (CMP)    Collection Time: 12/28/23  3:30 AM   Result Value Ref Range    Sodium 137 135 - 145 mmol/L    Potassium 4.3 3.6 - 5.5 mmol/L    Chloride 102 96 - 112 mmol/L    Co2 25 20 - 33 mmol/L    Anion Gap 10.0 7.0 - 16.0    Glucose 123 (H) 65 - 99 mg/dL    Bun 13 8 - 22 mg/dL    Creatinine 0.58 0.50 - 1.40 mg/dL    Calcium 7.9 (L) 8.5 - 10.5 mg/dL    Correct Calcium 9.2 8.5 - 10.5 mg/dL     AST(SGOT) 92 (H) 12 - 45 U/L    ALT(SGPT) 136 (H) 2 - 50 U/L    Alkaline Phosphatase 251 (H) 30 - 99 U/L    Total Bilirubin 1.4 0.1 - 1.5 mg/dL    Albumin 2.4 (L) 3.2 - 4.9 g/dL    Total Protein 5.8 (L) 6.0 - 8.2 g/dL    Globulin 3.4 1.9 - 3.5 g/dL    A-G Ratio 0.7 g/dL   LIPASE    Collection Time: 12/28/23  3:30 AM   Result Value Ref Range    Lipase 77 11 - 82 U/L   ESTIMATED GFR    Collection Time: 12/28/23  3:30 AM   Result Value Ref Range    GFR (CKD-EPI) 117 >60 mL/min/1.73 m 2   URINALYSIS    Collection Time: 12/28/23 10:17 AM    Specimen: Urine, Clean Catch   Result Value Ref Range    Color Yellow     Character Clear     Specific Gravity 1.016 <1.035    Ph 5.5 5.0 - 8.0    Glucose Negative Negative mg/dL    Ketones Trace (A) Negative mg/dL    Protein Negative Negative mg/dL    Bilirubin Negative Negative    Urobilinogen, Urine 0.2 Negative    Nitrite Negative Negative    Leukocyte Esterase Negative Negative    Occult Blood Negative Negative    Micro Urine Req see below        Radiology Review:  [id number]   Final Result      Portable fluoroscopic images obtained in the OR for intraoperative cholangiogram.      [id number] [id number]   Final Result      Digitized intraoperative radiograph is submitted for review.  This examination is not for diagnostic purpose but for guidance during a surgical procedure. Please see the patient's chart for full procedural details.      OUTSIDE IMAGES-CT ABDOMEN /PELVIS   Final Result            MDM (Data Review):   -Records reviewed and summarized in current documentation  -I personally reviewed and interpreted the laboratory results  -I personally reviewed the radiology images    Assessment/Recommendations:  Cholangitis s/p ERCP  Acute cholecystitis  S/p laparoscopic cholecystectomy converted to open cholecystectomy with Orquidea-en-Y hepaticojejunostomy for proximal common bile duct injury near the hepatic bifurcation   Gallstone pancreatitis    MDM:  This is a  53-year-old male with a past medical history as listed above who presented to [facility] on 12/26/2023 with cholangitis secondary to gallstone pancreatitis.  Patient underwent ERCP on 12/26/2023 during which time, biliary sphincterotomy was performed.  Several small stones were removed and a 10 Swedish by 5 cm plastic biliary stent was deployed into the bile duct.  Post procedurally, patient underwent laparoscopic cholecystectomy which was converted to open cholecystectomy.  Unfortunately, there was injury to patient's common bile duct near the hepatic bifurcation necessitating a Orquidea-en-Y hepaticojejunostomy to be done.  Postoperatively, patient reports right upper quadrant abdominal pain as well as right upper quadrant/epigastric pain with cranberry juice and water intake.  LFTs downtrending. Tbili normal. Hemoglobin stable.  Low suspicion for ERCP pancreatitis pain rather secondary to being postop day 1. No concern for post ERCP bleeding or infection.  Updated Dr. Puente regarding patient's intraoperative complications.  Discussed removing biliary stent prior to discharge, likely in the coming days when okay per surgery.    Update 1625: Discussed stent removal with Breanna GANN, and Mckayla Kasper PA-C. Per Breanna GANN, Dr. Meyers removed stent intraoperatively yesterday. Updated Dr. Solis and Dr. Puente.      Plan:  Monitor H/H and for bleeding  May obtain abdominal xray to verify no presence of stent.   Ok to have full liquids- advance per primary/surgical team.   Hepatobiliary surgery and general surgery involved-appreciate their recommendations.  Follow up per surgical recommendations.     No further inventions from acute GI team.  GI to sign off.  Please reconsult for any further questions or concerns.    Discussed with patient and wife at bedside via  Sophie [id number], Dr. Puente, Dr. Solis, Breanna Beasley surgical AZEEM, Mckayla Kasper PA-C with [facility]      Core Quality Measures    Reviewed items::  Labs, Medications and Radiology reports reviewed

## 2023-12-28 NOTE — PROGRESS NOTES
4 Eyes Skin Assessment Completed by Cristhian RN and KIANA Krishnamurthy.    Head WDL  Ears WDL  Nose WDL  Mouth WDL  Neck WDL  Breast/Chest Jaundice (minimal)  Shoulder Blades WDL  Spine WDL  (R) Arm/Elbow/Hand WDL  (L) Arm/Elbow/Hand WDL  Abdomen Incision ( 3x lap sites, horizontal incision, RLQ LILIAN drain)  Groin WDL  Scrotum/Coccyx/Buttocks WDL  (R) Leg WDL  (L) Leg WDL  (R) Heel/Foot/Toe WDL  (L) Heel/Foot/Toe WDL          Devices In Places Pulse Ox and SCD's, LILIAN drain      Interventions In Place Pillows    Possible Skin Injury No    Pictures Uploaded Into Epic N/A  Wound Consult Placed N/A  RN Wound Prevention Protocol Ordered No

## 2023-12-28 NOTE — PROGRESS NOTES
"    DATE: 12/28/2023    Post Operative Day  1 laparoscopic cholecystectomy.    INTERVAL EVENTS:  Complains of abdominal pain  LILIAN with bile   Tolerating clears  No flatus   On Zosyn  Lipase 77 (533)  LFT trending down  Added mulimodal    REVIEW OF SYSTEMS:  Review of Systems   Constitutional: Negative.    Respiratory: Negative.     Gastrointestinal:  Positive for abdominal pain. Negative for nausea and vomiting.        BM PTA   Genitourinary:         Voidng   Musculoskeletal: Negative.    Psychiatric/Behavioral: Negative.     All other systems reviewed and are negative.      PHYSICAL EXAMINATION:  Vital Signs: /69   Pulse 93   Temp 37.3 °C (99.1 °F) (Temporal)   Resp 16   Ht 1.651 m (5' 5\")   Wt 85.5 kg (188 lb 7.9 oz)   SpO2 90%   Physical Exam  Vitals and nursing note reviewed.   Constitutional:       General: He is not in acute distress.     Appearance: He is not ill-appearing.   HENT:      Mouth/Throat:      Mouth: Mucous membranes are moist.      Pharynx: Oropharynx is clear.   Pulmonary:      Effort: Pulmonary effort is normal. No respiratory distress.   Abdominal:      Comments: Semi firm  Distended   Tenderness with palpation in the RUQ  Dressing clean and intact - some shadowing   LILIAN RLQ with bilious drainage    Skin:     General: Skin is warm and dry.      Capillary Refill: Capillary refill takes less than 2 seconds.   Neurological:      Mental Status: He is alert and oriented to person, place, and time.   Psychiatric:         Behavior: Behavior normal.         Thought Content: Thought content normal.         LABORATORY VALUES:   Recent Labs     12/26/23 0427 12/27/23  0538 12/28/23  0330   WBC 27.0* 17.8* 17.8*   RBC 4.10* 4.08* 3.93*   HEMOGLOBIN 11.5* 11.4* 11.1*   HEMATOCRIT 35.0* 35.1* 33.6*   MCV 85.4 86.0 85.5   MCH 28.0 27.9 28.2   MCHC 32.9 32.5 33.0   RDW 43.8 45.0 45.5   PLATELETCT 316 366 412   MPV 9.5 9.7 9.4     Recent Labs     12/26/23  0427 12/27/23  0538 12/28/23  0330   SODIUM " 135 137 137   POTASSIUM 3.4* 4.1 4.3   CHLORIDE 103 103 102   CO2 20 23 25   GLUCOSE 113* 109* 123*   BUN 12 13 13   CREATININE 0.63 0.54 0.58   CALCIUM 8.0* 8.3* 7.9*     Recent Labs     12/26/23  0427 12/26/23  1408 12/27/23  0538 12/28/23  0330   ASTSGOT 87*  --  33 92*   ALTSGPT 142*  --  97* 136*   TBILIRUBIN 6.1*  --  1.7* 1.4   ALKPHOSPHAT 466*  --  364* 251*   GLOBULIN 3.9*  --  4.0* 3.4   INR  --  1.20*  --   --      Recent Labs     12/26/23  1408   INR 1.20*        IMAGING:   [id number]   Final Result      Portable fluoroscopic images obtained in the OR for intraoperative cholangiogram.      [id number] DUCTS   Final Result      Digitized intraoperative radiograph is submitted for review.  This examination is not for diagnostic purpose but for guidance during a surgical procedure. Please see the patient's chart for full procedural details.      OUTSIDE IMAGES-CT ABDOMEN /PELVIS   Final Result              ASSESSMENT AND PLAN:   Encourage pulmonary hygiene  Mobilize   Pain control  Lovenox this evening      Discussed patient condition with Family, RN, Patient, and Dr. Meyers .

## 2023-12-28 NOTE — ANESTHESIA POSTPROCEDURE EVALUATION
Patient: Sharath Gupta    Procedure Summary       Date: 12/27/23 Room / Location: [facility] 08 / SURGERY [facility]    Anesthesia Start: 1038 Anesthesia Stop: 1516    Procedures:       CHOLECYSTECTOMY, LAPAROSCOPIC CONVERTED TO OPEN (Abdomen)      HEPATICOJEJUNOSTOMY, ALIX-EN-Y (Abdomen) Diagnosis: (acute cholecystitis with choledocholithiasis chronic and acute, Bile duct injury)    Surgeons: Derrick Meyers M.D. Responsible Provider: Niki Casey M.D.    Anesthesia Type: general ASA Status: 2            Final Anesthesia Type: general  Last vitals  BP   Blood Pressure: 116/56    Temp   36.3 °C (97.3 °F)    Pulse   88   Resp   20    SpO2   99 %      Anesthesia Post Evaluation    Patient location during evaluation: PACU  Patient participation: complete - patient participated  Level of consciousness: awake  Pain score: 6    Airway patency: patent  Anesthetic complications: no  Cardiovascular status: hemodynamically stable  Respiratory status: acceptable  Hydration status: acceptable    PONV: none          No notable events documented.     Nurse Pain Score: 7 (NPRS)

## 2023-12-28 NOTE — CARE PLAN
The patient is Stable - Low risk of patient condition declining or worsening    Shift Goals: Drain, Pain, Safety  Clinical Goals: Drain, Pain, Safety  Patient Goals: Pain, Rest  Family Goals: Rest    Progress made toward(s) clinical / shift goals:  Pt is resting comfortably in bed, pain well controlled with current medication.  Pt wife staying in room and helping to translate basic care communication where this RN's Ethiopian is lacking.  Pt uses call light appropriately.  Hourly rounding to assess LILIAN drain output.

## 2023-12-28 NOTE — PROGRESS NOTES
Assumed care of patient at 1915. Bedside report received from Sho BRUNO. Assessment complete.  AA&Ox4. Denies CP/SOB.  Reporting 8/10 pain. Medicated per MAR.   Educated patient regarding pharmacologic and non pharmacologic modalities for pain management.  Skin per flowsheets  Tolerating Clear diet. Denies N/V.  + Void. Last BM PTA  Pt ambulates with SBA.  All needs met at this time. Call light within reach. Pt calls appropriately. Bed low and locked, non skid socks in place. Hourly rounding in place.

## 2023-12-28 NOTE — PROGRESS NOTES
Hospital Medicine Daily Progress Note    Date of Service  12/28/2023    Chief Complaint  Sharath Gupta is a 53 y.o. male admitted 12/26/2023 with abdominal pain    Hospital Course  This is a 53-year-old male with PMHx of obesity who was transferred to our facility with severe abdominal pain, significant for cholangitis and gallstone pancreatitis.     Lipase at outside facility was 6000, noted obstructive liver test patterns.  On admission, GI was consulted patient underwent ERCP with biliary sphincterotomy, calculi removal, biliary stent insertion 12/26.  Patient is to follow-up with GI in 4 to 6 weeks for stent removal.    General surgery was consulted, patient underwent laparoscopic cholecystectomy converted to open cholecystectomy with hepaticojejunostomy for proximal common bile duct injury near the hepatic bifurcation 12/27.    Interval Problem Update  Patient's cholecystectomy converted to open cholecystectomy with hepaticojejunostomy for proximal common bile duct injury near the hepatic bifurcation yesterday.  LILIAN drain in place with bilious output, as per hepatobiliary surgery, patient will likely discharge with drain in place.    Nursing staff to instruct patient on how to maintain, manage and drain LILIAN drain.    Patient tolerating clear liquid diet, advance as tolerated.  Started patient on PPI twice daily.    Patient with no bowel movement or flatus.  Continue with bowel regimen.    Continue with Zosyn at this time.    Patient noted to have left lower lobe fluid collection, discussed with hepatobiliary, no need for drainage.    Case discussed with GI, stent was removed during surgery.  X-ray imaging ordered.    Patient's wife at bedside, updated on plan of care, all questions answered.    I have discussed this patient's plan of care and discharge plan at IDT rounds today with Case Management, Nursing, Nursing leadership, and other members of the IDT team.    Consultants/Specialty  general surgery, GI, and  hepatobiliary surgery    Code Status  Full Code    Disposition  The patient is not medically cleared for discharge to home or a post-acute facility.  Anticipate discharge to: home with close outpatient follow-up    I have placed the appropriate orders for post-discharge needs.    Review of Systems  Review of Systems   All other systems reviewed and are negative.       Physical Exam  Temp:  [36.5 °C (97.7 °F)-37.5 °C (99.5 °F)] 36.5 °C (97.7 °F)  Pulse:  [] 103  Resp:  [12-19] 18  BP: ()/(59-78) 99/67  SpO2:  [88 %-98 %] 92 %    Physical Exam  Vitals and nursing note reviewed.   Constitutional:       General: He is not in acute distress.  HENT:      Head: Normocephalic and atraumatic.   Eyes:      Extraocular Movements: Extraocular movements intact.      Conjunctiva/sclera: Conjunctivae normal.      Pupils: Pupils are equal, round, and reactive to light.   Cardiovascular:      Rate and Rhythm: Normal rate and regular rhythm.      Pulses: Normal pulses.      Heart sounds: No murmur heard.     No friction rub. No gallop.   Pulmonary:      Effort: Pulmonary effort is normal. No respiratory distress.      Breath sounds: Normal breath sounds. No wheezing, rhonchi or rales.   Abdominal:      General: Bowel sounds are normal. There is no distension.      Palpations: Abdomen is soft.      Tenderness: There is no abdominal tenderness.      Comments: Surgical site with no evidence of erythema or cellulitis, LLIIAN drain in place with bilious output   Musculoskeletal:         General: No swelling or tenderness. Normal range of motion.      Cervical back: Normal range of motion and neck supple. No muscular tenderness.      Right lower leg: No edema.      Left lower leg: No edema.   Skin:     General: Skin is warm and dry.      Capillary Refill: Capillary refill takes less than 2 seconds.      Findings: No bruising, erythema or rash.   Neurological:      General: No focal deficit present.      Mental Status: He is alert  and oriented to person, place, and time.         Fluids    Intake/Output Summary (Last 24 hours) at 12/28/2023 1652  Last data filed at 12/28/2023 1525  Gross per 24 hour   Intake 2641.72 ml   Output 855 ml   Net 1786.72 ml       Laboratory  Recent Labs     12/26/23  0427 12/27/23  0538 12/28/23  0330   WBC 27.0* 17.8* 17.8*   RBC 4.10* 4.08* 3.93*   HEMOGLOBIN 11.5* 11.4* 11.1*   HEMATOCRIT 35.0* 35.1* 33.6*   MCV 85.4 86.0 85.5   MCH 28.0 27.9 28.2   MCHC 32.9 32.5 33.0   RDW 43.8 45.0 45.5   PLATELETCT 316 366 412   MPV 9.5 9.7 9.4     Recent Labs     12/26/23  0427 12/27/23  0538 12/28/23  0330   SODIUM 135 137 137   POTASSIUM 3.4* 4.1 4.3   CHLORIDE 103 103 102   CO2 20 23 25   GLUCOSE 113* 109* 123*   BUN 12 13 13   CREATININE 0.63 0.54 0.58   CALCIUM 8.0* 8.3* 7.9*     Recent Labs     12/26/23  1408   INR 1.20*               Imaging  [id number]   Final Result      Portable fluoroscopic images obtained in the OR for intraoperative cholangiogram.      [id number] DUCTS   Final Result      Digitized intraoperative radiograph is submitted for review.  This examination is not for diagnostic purpose but for guidance during a surgical procedure. Please see the patient's chart for full procedural details.      OUTSIDE IMAGES-CT ABDOMEN /PELVIS   Final Result      [id number] VIEW    (Results Pending)        Assessment/Plan  * Cholangitis- (present on admission)  Assessment & Plan  On admission, GI was consulted patient underwent ERCP with biliary sphincterotomy, calculi removal, biliary stent insertion 12/26.  Patient is to follow-up with GI in 4 to 6 weeks for stent removal.    General surgery was consulted, patient underwent laparoscopic cholecystectomy converted to open cholecystectomy with hepaticojejunostomy for proximal common bile duct injury near the hepatic bifurcation 12/27.    Zosyn     Acute biliary pancreatitis without infection or necrosis- (present on admission)  Assessment &  Plan  On admission, GI was consulted patient underwent ERCP with biliary sphincterotomy, calculi removal, biliary stent insertion 12/26.      Case discussed with GI, stent was removed during surgery.  X-ray imaging ordered.    Hypokalemia  Assessment & Plan  Labs reviewed, resolved  Serial BMP, magnesium, phosphorus levels ordered for tomorrow morning to continue to monitor electrolytes     Elevated LFTs  Assessment & Plan  Elevated LFTs in the setting of cholangitis, gallstone pancreatitis  Serial CMP  Improving      Neutrophilic leukocytosis  Assessment & Plan  Secondary to ascending cholangitis    Class 1 obesity due to excess calories with body mass index (BMI) of 31.0 to 31.9 in adult- (present on admission)  Assessment & Plan  BMI 31.84, patient was counselled on healthy diet and lifestyle and risk of cardiovascular diseases due to obesity.    Sepsis with acute liver failure without hepatic coma or septic shock (HCC)- (present on admission)  Assessment & Plan  This is Sepsis Present on admission  SIRS criteria identified on my evaluation include:   Fever, with temperature greater than 100.9 deg F, Tachycardia, with heart rate greater than 90 BPM, and Leukocytosis, with WBC greater than 12,000  Clinical indicators of end organ dysfunction include Acute Liver Failure, with bilirubin greater than 2  Source is cholangitis  Sepsis protocol initiated  Crystalloid Fluid Administration: At outside facility  IV antibiotics as appropriate for source of sepsis  Reassessment: I have reassessed the patient's hemodynamic status  Blood cultures drawn at outside facility, did not follow  Started on Zosyn 12/26/23         VTE prophylaxis: Lovenox    I have performed a physical exam and reviewed and updated ROS and Plan today (12/28/2023). In review of yesterday's note (12/27/2023), there are no changes except as documented above.    Greater than 51 minutes spent prepping to see patient (e.g. reviewing EMR) obtaining and/or  reviewing separately obtained history. Performing a medically appropriate examination and evaluation. Independently interpreting results and communicating results to patient/family/caregiver. Counseling and educating the patient/family/caregiver. Ordering medications, tests, and/or procedures. Referring and communicating with other health care professionals.  Documenting clinical information in EPIC. Care coordination.

## 2023-12-28 NOTE — PROGRESS NOTES
Patient arrived to T402 at approximately 1815, report received from PACU RN. Patient resting in bed. Patient is Kazakh speaking, family at bedside providing translation. Vital signs taken. PRN Morphine administered for complaints of severe pain. Surgical dressings to abdomen CD&I, LILIAN drain present. Patient and family updated on plan of care, encouraged to notify staff for any needs/assistance. Call light within reach.

## 2023-12-28 NOTE — OR NURSING
Report called to Sho BRUNO. Plan of care discussed. Patient resting with even and unlabored respirations. Arouses easily to voice. Patient denies nausea. Drain emptied. Patient states all needs met at this time. Tolerating ice chips without difficulty.

## 2023-12-28 NOTE — PROGRESS NOTES
Date of Service  December 28, 2023     Chief Complaint:   Cholangitis with gallstone pancreatitis and bile duct injury    Surgery Completed  Orquidea-en-Y hepaticojejunostomy     Hospital Course  POD # 1     Interval Problem Update  Vitals stable  LILIAN 530 cc  WBC 17.8 from 27  HGB 11.1 from 11.5.  Plt 412 from 366  Alk phos 251 from 364  Bili 1.4 from 1.7  Clear diet  C/o abdominal pain and GERD  Denies any nausea    Problem List  Principal Problem:    Cholangitis (POA: Yes)  Active Problems:    Sepsis with acute liver failure without hepatic coma or septic shock (HCC) (POA: Yes)    Acute biliary pancreatitis without infection or necrosis (POA: Yes)    Class 1 obesity due to excess calories with body mass index (BMI) of 31.0 to 31.9 in adult (POA: Yes)    Neutrophilic leukocytosis (POA: Unknown)    Elevated LFTs (POA: Unknown)    Hypokalemia (POA: Unknown)  Resolved Problems:    * No resolved hospital problems. *       Subjective  Review of Systems   Constitutional:  Negative for chills and fever.   Respiratory:  Negative for shortness of breath.    Cardiovascular:  Negative for chest pain.   Gastrointestinal:  Positive for abdominal pain and heartburn. Negative for nausea and vomiting.         Objective  Temp:  [36.1 °C (97 °F)-37.3 °C (99.1 °F)] 37.3 °C (99.1 °F)  Pulse:  [83-96] 93  Resp:  [12-20] 16  BP: (102-124)/(56-75) 124/69  SpO2:  [90 %-99 %] 90 %      Physical Exam  Vitals and nursing note reviewed.   Constitutional:       Appearance: Normal appearance.   Abdominal:      Comments: Dressing intact, LILIAN with bilious output   Musculoskeletal:         General: No swelling or tenderness.   Neurological:      Mental Status: He is alert.          Fluids    Intake/Output Summary (Last 24 hours) at 12/28/2023 0803  Last data filed at 12/28/2023 0634  Gross per 24 hour   Intake 4901.72 ml   Output 980 ml   Net 3921.72 ml         Labs  Lab Results   Component Value Date/Time    SODIUM 137 12/28/2023 03:30 AM     POTASSIUM 4.3 12/28/2023 03:30 AM    CHLORIDE 102 12/28/2023 03:30 AM    CO2 25 12/28/2023 03:30 AM    GLUCOSE 123 (H) 12/28/2023 03:30 AM    BUN 13 12/28/2023 03:30 AM    CREATININE 0.58 12/28/2023 03:30 AM         Lab Results   Component Value Date/Time    PROTHROMBTM 15.4 (H) 12/26/2023 02:08 PM    INR 1.20 (H) 12/26/2023 02:08 PM         Lab Results   Component Value Date/Time    WBC 17.8 (H) 12/28/2023 03:30 AM    RBC 3.93 (L) 12/28/2023 03:30 AM    HEMOGLOBIN 11.1 (L) 12/28/2023 03:30 AM    HEMATOCRIT 33.6 (L) 12/28/2023 03:30 AM    MCV 85.5 12/28/2023 03:30 AM    MCH 28.2 12/28/2023 03:30 AM    MCHC 33.0 12/28/2023 03:30 AM    MPV 9.4 12/28/2023 03:30 AM    NEUTSPOLYS 78.10 (H) 12/28/2023 03:30 AM    LYMPHOCYTES 12.50 (L) 12/28/2023 03:30 AM    MONOCYTES 8.50 12/28/2023 03:30 AM    EOSINOPHILS 0.10 12/28/2023 03:30 AM    BASOPHILS 0.20 12/28/2023 03:30 AM         Recent Labs     12/26/23  0427 12/26/23  1408 12/27/23  0538 12/28/23  0330   ASTSGOT 87*  --  33 92*   ALTSGPT 142*  --  97* 136*   TBILIRUBIN 6.1*  --  1.7* 1.4   GLOBULIN 3.9*  --  4.0* 3.4   INR  --  1.20*  --   --           Patient Active Problem List   Diagnosis    Sepsis with acute liver failure without hepatic coma or septic shock (HCC)    Cholangitis    Acute biliary pancreatitis without infection or necrosis    Class 1 obesity due to excess calories with body mass index (BMI) of 31.0 to 31.9 in adult    Neutrophilic leukocytosis    Elevated LFTs    Hypokalemia        Assessment/Plan  POD #1 s/p Orquidea-en-Y hepaticojejunostomy for bile duct injury - LILIAN drain output noted. Will continue to monitor closely. If drain continues to leak bile while in house will plan to follow up outpt with pt going home with LILIAN drain. Regardless of whether he goes home with or without LILIAN drain, will plan to follow patient on outpatient basis. OK to advance diet as tolerable from HBP standpoint. Defer to ACS and hospitalist. Ambulate and encourage IS.   GERD -  started on Pepcid BID    Patient seen with Dr. Olivo

## 2023-12-28 NOTE — PROGRESS NOTES
4 Eyes Skin Assessment Completed by KIANA Morrow and KIANA Krishnamurthy.    Head Jaundice  Ears WDL  Nose WDL  Mouth WDL  Neck WDL  Breast/Chest jaundice  Shoulder Blades WDL  Spine WDL  (R) Arm/Elbow/Hand WDL  (L) Arm/Elbow/Hand WDL  Abdomen Transverse incision re-dressed wth island drg, x2 lap sites stapled LATRICIA, LILIAN drain, jaundice  Groin WDL  Scrotum/Coccyx/Buttocks WDL  (R) Leg WDL  (L) Leg WDL  (R) Heel/Foot/Toe WDL  (L) Heel/Foot/Toe WDL          Devices In Places: 20g PIV RAC, 20g PIV LAC, SCDs, LILIAN drain, pulse ox      Interventions In Place N/A    Possible Skin Injury No    Pictures Uploaded Into Epic N/A  Wound Consult Placed N/A  RN Wound Prevention Protocol Ordered No

## 2023-12-29 PROBLEM — K59.04 CHRONIC IDIOPATHIC CONSTIPATION: Status: ACTIVE | Noted: 2023-12-29

## 2023-12-29 LAB
ALBUMIN SERPL BCP-MCNC: 2.8 G/DL (ref 3.2–4.9)
ALBUMIN/GLOB SERPL: 0.8 G/DL
ALP SERPL-CCNC: 209 U/L (ref 30–99)
ALT SERPL-CCNC: 99 U/L (ref 2–50)
ANION GAP SERPL CALC-SCNC: 10 MMOL/L (ref 7–16)
AST SERPL-CCNC: 45 U/L (ref 12–45)
BASOPHILS # BLD AUTO: 0.4 % (ref 0–1.8)
BASOPHILS # BLD: 0.06 K/UL (ref 0–0.12)
BILIRUB SERPL-MCNC: 1.2 MG/DL (ref 0.1–1.5)
BUN SERPL-MCNC: 17 MG/DL (ref 8–22)
CALCIUM ALBUM COR SERPL-MCNC: 8.9 MG/DL (ref 8.5–10.5)
CALCIUM SERPL-MCNC: 7.9 MG/DL (ref 8.5–10.5)
CHLORIDE SERPL-SCNC: 100 MMOL/L (ref 96–112)
CO2 SERPL-SCNC: 25 MMOL/L (ref 20–33)
CREAT SERPL-MCNC: 0.84 MG/DL (ref 0.5–1.4)
EOSINOPHIL # BLD AUTO: 0.49 K/UL (ref 0–0.51)
EOSINOPHIL NFR BLD: 3.2 % (ref 0–6.9)
ERYTHROCYTE [DISTWIDTH] IN BLOOD BY AUTOMATED COUNT: 47.1 FL (ref 35.9–50)
GFR SERPLBLD CREATININE-BSD FMLA CKD-EPI: 105 ML/MIN/1.73 M 2
GLOBULIN SER CALC-MCNC: 3.3 G/DL (ref 1.9–3.5)
GLUCOSE SERPL-MCNC: 105 MG/DL (ref 65–99)
HCT VFR BLD AUTO: 30.5 % (ref 42–52)
HGB BLD-MCNC: 9.6 G/DL (ref 14–18)
IMM GRANULOCYTES # BLD AUTO: 0.12 K/UL (ref 0–0.11)
IMM GRANULOCYTES NFR BLD AUTO: 0.8 % (ref 0–0.9)
LYMPHOCYTES # BLD AUTO: 2.49 K/UL (ref 1–4.8)
LYMPHOCYTES NFR BLD: 16.2 % (ref 22–41)
MAGNESIUM SERPL-MCNC: 2.1 MG/DL (ref 1.5–2.5)
MCH RBC QN AUTO: 27.7 PG (ref 27–33)
MCHC RBC AUTO-ENTMCNC: 31.5 G/DL (ref 32.3–36.5)
MCV RBC AUTO: 87.9 FL (ref 81.4–97.8)
MONOCYTES # BLD AUTO: 1.88 K/UL (ref 0–0.85)
MONOCYTES NFR BLD AUTO: 12.3 % (ref 0–13.4)
NEUTROPHILS # BLD AUTO: 10.3 K/UL (ref 1.82–7.42)
NEUTROPHILS NFR BLD: 67.1 % (ref 44–72)
NRBC # BLD AUTO: 0 K/UL
NRBC BLD-RTO: 0 /100 WBC (ref 0–0.2)
PHOSPHATE SERPL-MCNC: 3.5 MG/DL (ref 2.5–4.5)
PLATELET # BLD AUTO: 372 K/UL (ref 164–446)
PMV BLD AUTO: 9.4 FL (ref 9–12.9)
POTASSIUM SERPL-SCNC: 3.6 MMOL/L (ref 3.6–5.5)
PROT SERPL-MCNC: 6.1 G/DL (ref 6–8.2)
RBC # BLD AUTO: 3.47 M/UL (ref 4.7–6.1)
SODIUM SERPL-SCNC: 135 MMOL/L (ref 135–145)
WBC # BLD AUTO: 15.3 K/UL (ref 4.8–10.8)

## 2023-12-29 PROCEDURE — 700111 HCHG RX REV CODE 636 W/ 250 OVERRIDE (IP): Mod: JZ | Performed by: STUDENT IN AN ORGANIZED HEALTH CARE EDUCATION/TRAINING PROGRAM

## 2023-12-29 PROCEDURE — 770001 HCHG ROOM/CARE - MED/SURG/GYN PRIV*

## 2023-12-29 PROCEDURE — 84100 ASSAY OF PHOSPHORUS: CPT

## 2023-12-29 PROCEDURE — 85025 COMPLETE CBC W/AUTO DIFF WBC: CPT

## 2023-12-29 PROCEDURE — 700105 HCHG RX REV CODE 258: Performed by: STUDENT IN AN ORGANIZED HEALTH CARE EDUCATION/TRAINING PROGRAM

## 2023-12-29 PROCEDURE — A9270 NON-COVERED ITEM OR SERVICE: HCPCS | Performed by: SPECIALIST

## 2023-12-29 PROCEDURE — 99024 POSTOP FOLLOW-UP VISIT: CPT | Performed by: NURSE PRACTITIONER

## 2023-12-29 PROCEDURE — 99024 POSTOP FOLLOW-UP VISIT: CPT | Performed by: SPECIALIST

## 2023-12-29 PROCEDURE — 700102 HCHG RX REV CODE 250 W/ 637 OVERRIDE(OP): Performed by: STUDENT IN AN ORGANIZED HEALTH CARE EDUCATION/TRAINING PROGRAM

## 2023-12-29 PROCEDURE — 99232 SBSQ HOSP IP/OBS MODERATE 35: CPT | Performed by: GENERAL PRACTICE

## 2023-12-29 PROCEDURE — 700102 HCHG RX REV CODE 250 W/ 637 OVERRIDE(OP): Performed by: NURSE PRACTITIONER

## 2023-12-29 PROCEDURE — A9270 NON-COVERED ITEM OR SERVICE: HCPCS | Performed by: STUDENT IN AN ORGANIZED HEALTH CARE EDUCATION/TRAINING PROGRAM

## 2023-12-29 PROCEDURE — 700102 HCHG RX REV CODE 250 W/ 637 OVERRIDE(OP): Performed by: GENERAL PRACTICE

## 2023-12-29 PROCEDURE — A9270 NON-COVERED ITEM OR SERVICE: HCPCS | Performed by: NURSE PRACTITIONER

## 2023-12-29 PROCEDURE — A9270 NON-COVERED ITEM OR SERVICE: HCPCS | Performed by: GENERAL PRACTICE

## 2023-12-29 PROCEDURE — 80053 COMPREHEN METABOLIC PANEL: CPT

## 2023-12-29 PROCEDURE — 700102 HCHG RX REV CODE 250 W/ 637 OVERRIDE(OP): Performed by: SPECIALIST

## 2023-12-29 PROCEDURE — 83735 ASSAY OF MAGNESIUM: CPT

## 2023-12-29 PROCEDURE — 700111 HCHG RX REV CODE 636 W/ 250 OVERRIDE (IP): Mod: JZ | Performed by: NURSE PRACTITIONER

## 2023-12-29 PROCEDURE — 700105 HCHG RX REV CODE 258: Performed by: NURSE PRACTITIONER

## 2023-12-29 RX ORDER — BISACODYL 5 MG
10 TABLET, DELAYED RELEASE (ENTERIC COATED) ORAL ONCE
Status: COMPLETED | OUTPATIENT
Start: 2023-12-29 | End: 2023-12-29

## 2023-12-29 RX ORDER — LACTULOSE 20 G/30ML
45 SOLUTION ORAL ONCE
Status: COMPLETED | OUTPATIENT
Start: 2023-12-29 | End: 2023-12-29

## 2023-12-29 RX ORDER — METHOCARBAMOL 750 MG/1
750 TABLET, FILM COATED ORAL 3 TIMES DAILY
Status: DISCONTINUED | OUTPATIENT
Start: 2023-12-29 | End: 2023-12-30 | Stop reason: HOSPADM

## 2023-12-29 RX ADMIN — ACETAMINOPHEN 1000 MG: 500 TABLET ORAL at 04:53

## 2023-12-29 RX ADMIN — GABAPENTIN 100 MG: 100 CAPSULE ORAL at 18:57

## 2023-12-29 RX ADMIN — GABAPENTIN 100 MG: 100 CAPSULE ORAL at 12:32

## 2023-12-29 RX ADMIN — METHOCARBAMOL 750 MG: 750 TABLET ORAL at 12:32

## 2023-12-29 RX ADMIN — PIPERACILLIN AND TAZOBACTAM 3.38 G: 3; .375 INJECTION, POWDER, FOR SOLUTION INTRAVENOUS at 16:37

## 2023-12-29 RX ADMIN — BISACODYL 10 MG: 5 TABLET, COATED ORAL at 07:33

## 2023-12-29 RX ADMIN — BISACODYL 10 MG: 10 SUPPOSITORY RECTAL at 12:33

## 2023-12-29 RX ADMIN — METHOCARBAMOL 1000 MG: 100 INJECTION, SOLUTION INTRAMUSCULAR; INTRAVENOUS at 06:16

## 2023-12-29 RX ADMIN — LACTULOSE 45 ML: 20 SOLUTION ORAL at 22:03

## 2023-12-29 RX ADMIN — DOCUSATE SODIUM 50 MG AND SENNOSIDES 8.6 MG 2 TABLET: 8.6; 5 TABLET, FILM COATED ORAL at 18:57

## 2023-12-29 RX ADMIN — ENOXAPARIN SODIUM 40 MG: 100 INJECTION SUBCUTANEOUS at 18:57

## 2023-12-29 RX ADMIN — METHOCARBAMOL 750 MG: 750 TABLET ORAL at 18:57

## 2023-12-29 RX ADMIN — BISACODYL 10 MG: 5 TABLET, COATED ORAL at 12:37

## 2023-12-29 RX ADMIN — FAMOTIDINE 20 MG: 20 TABLET ORAL at 04:53

## 2023-12-29 RX ADMIN — GABAPENTIN 100 MG: 100 CAPSULE ORAL at 04:54

## 2023-12-29 RX ADMIN — ACETAMINOPHEN 1000 MG: 500 TABLET ORAL at 22:03

## 2023-12-29 RX ADMIN — OXYCODONE HYDROCHLORIDE 10 MG: 10 TABLET ORAL at 12:31

## 2023-12-29 RX ADMIN — PIPERACILLIN AND TAZOBACTAM 3.38 G: 3; .375 INJECTION, POWDER, FOR SOLUTION INTRAVENOUS at 07:35

## 2023-12-29 RX ADMIN — FAMOTIDINE 20 MG: 20 TABLET ORAL at 18:57

## 2023-12-29 RX ADMIN — BISACODYL 10 MG: 10 SUPPOSITORY RECTAL at 12:32

## 2023-12-29 RX ADMIN — OXYCODONE HYDROCHLORIDE 10 MG: 10 TABLET ORAL at 18:59

## 2023-12-29 RX ADMIN — DOCUSATE SODIUM 50 MG AND SENNOSIDES 8.6 MG 2 TABLET: 8.6; 5 TABLET, FILM COATED ORAL at 04:52

## 2023-12-29 ASSESSMENT — ENCOUNTER SYMPTOMS
VOMITING: 0
CHILLS: 0
ABDOMINAL PAIN: 1
NAUSEA: 0
SHORTNESS OF BREATH: 0
FEVER: 0
HEARTBURN: 0

## 2023-12-29 ASSESSMENT — PAIN DESCRIPTION - PAIN TYPE
TYPE: ACUTE PAIN

## 2023-12-29 NOTE — CARE PLAN
The patient is Stable - Low risk of patient condition declining or worsening    Shift Goals  Clinical Goals: abx therapy  Patient Goals: Pain, Rest  Family Goals: Rest    Progress made toward(s) clinical / shift goals:  iv abx continued. Remains afebrile. Labs in AM    Patient is not progressing towards the following goals:    Problem: Knowledge Deficit - Standard  Goal: Patient and family/care givers will demonstrate understanding of plan of care, disease process/condition, diagnostic tests and medications  Outcome: Progressing     Problem: Pain - Standard  Goal: Alleviation of pain or a reduction in pain to the patient’s comfort goal  Outcome: Progressing

## 2023-12-29 NOTE — ASSESSMENT & PLAN NOTE
Aggressive bowel regimen   Ambulation  Dulcolax x 1 ordered 12/29  Lactulose ordered 12/29  BM x 3 12/30

## 2023-12-29 NOTE — PROGRESS NOTES
Date of Service  December 29, 2023     Chief Complaint:   Cholangitis with gallstone pancreatitis and bile duct injury    Surgery Completed  Orquidea-en-Y hepaticojejunostomy     Hospital Course  POD # 2     Interval Problem Update  Vitals stable  LILIAN 855 cc  WBC 15.3 from 17.8 0 on Zosyn  HGB 9.6 from 11.1   Alk phos 209 from 251   Bili 1.2  Clear diet  Pain significantly improved   Denies any nausea  No flatus or BM    Problem List  Principal Problem:    Cholangitis (POA: Yes)  Active Problems:    Sepsis with acute liver failure without hepatic coma or septic shock (HCC) (POA: Yes)    Acute biliary pancreatitis without infection or necrosis (POA: Yes)    Class 1 obesity due to excess calories with body mass index (BMI) of 31.0 to 31.9 in adult (POA: Yes)    Neutrophilic leukocytosis (POA: Unknown)    Elevated LFTs (POA: Unknown)    Hypokalemia (POA: Unknown)    Chronic idiopathic constipation (POA: Unknown)  Resolved Problems:    * No resolved hospital problems. *       Subjective  Review of Systems   Constitutional:  Negative for chills and fever.   Respiratory:  Negative for shortness of breath.    Cardiovascular:  Negative for chest pain.   Gastrointestinal:  Positive for abdominal pain. Negative for heartburn, nausea and vomiting.         Objective  Temp:  [36.5 °C (97.7 °F)-36.8 °C (98.2 °F)] 36.8 °C (98.2 °F)  Pulse:  [] 100  Resp:  [16-18] 16  BP: ()/(61-68) 97/63  SpO2:  [91 %-94 %] 94 %      Physical Exam  Vitals and nursing note reviewed.   Constitutional:       Appearance: Normal appearance.   Abdominal:      General: There is no distension.      Tenderness: There is no abdominal tenderness.      Comments: Dressing intact, LILIAN with bilious output   Musculoskeletal:         General: No swelling or tenderness.   Neurological:      Mental Status: He is alert.          Fluids    Intake/Output Summary (Last 24 hours) at 12/28/2023 0803  Last data filed at 12/28/2023 0634  Gross per 24 hour    Intake 4901.72 ml   Output 980 ml   Net 3921.72 ml         Labs  Lab Results   Component Value Date/Time    SODIUM 135 12/29/2023 12:18 AM    POTASSIUM 3.6 12/29/2023 12:18 AM    CHLORIDE 100 12/29/2023 12:18 AM    CO2 25 12/29/2023 12:18 AM    GLUCOSE 105 (H) 12/29/2023 12:18 AM    BUN 17 12/29/2023 12:18 AM    CREATININE 0.84 12/29/2023 12:18 AM         Lab Results   Component Value Date/Time    PROTHROMBTM 15.4 (H) 12/26/2023 02:08 PM    INR 1.20 (H) 12/26/2023 02:08 PM         Lab Results   Component Value Date/Time    WBC 15.3 (H) 12/29/2023 12:18 AM    RBC 3.47 (L) 12/29/2023 12:18 AM    HEMOGLOBIN 9.6 (L) 12/29/2023 12:18 AM    HEMATOCRIT 30.5 (L) 12/29/2023 12:18 AM    MCV 87.9 12/29/2023 12:18 AM    MCH 27.7 12/29/2023 12:18 AM    MCHC 31.5 (L) 12/29/2023 12:18 AM    MPV 9.4 12/29/2023 12:18 AM    NEUTSPOLYS 67.10 12/29/2023 12:18 AM    LYMPHOCYTES 16.20 (L) 12/29/2023 12:18 AM    MONOCYTES 12.30 12/29/2023 12:18 AM    EOSINOPHILS 3.20 12/29/2023 12:18 AM    BASOPHILS 0.40 12/29/2023 12:18 AM         Recent Labs     12/26/23  0427 12/26/23  1408 12/27/23  0538 12/28/23  0330 12/29/23  0018   ASTSGOT 87*  --  33 92* 45   ALTSGPT 142*  --  97* 136* 99*   TBILIRUBIN 6.1*  --  1.7* 1.4 1.2   GLOBULIN 3.9*  --  4.0* 3.4 3.3   INR  --  1.20*  --   --   --             Patient Active Problem List   Diagnosis    Sepsis with acute liver failure without hepatic coma or septic shock (HCC)    Cholangitis    Acute biliary pancreatitis without infection or necrosis    Class 1 obesity due to excess calories with body mass index (BMI) of 31.0 to 31.9 in adult    Neutrophilic leukocytosis    Elevated LFTs    Hypokalemia    Chronic idiopathic constipation        Assessment/Plan  POD #2 s/p Orquidea-en-Y hepaticojejunostomy for bile duct injury - LILIAN drain output noted. Will continue to monitor closely. Anticipate pt will need to d/c home with drain unless significant decreased output while in the hospital. Regular diet.   Ambulate and encourage IS. Patient scheduled to return to our office on 1/10 for follow up on drain and staple removal.     Case d/w with Dr. Olivo

## 2023-12-29 NOTE — DIETARY
Nutrition Update:    Day 3 of admit.  Sharath Gupta is a 53 y.o. male with admitting DX of Cholangitis [K83.09].  Patient being followed to optimize nutrition.    Current Diet: Full Liquid, diet just advanced yesterday.   Pt with cholangitis, pt s/p ERCP with biliary sphincterotomy, calculi removal, biliary stent insertion 12/26 as well as laparoscopic cholecystectomy converted to open cholecystectomy with hepaticojejunostomy for proximal common bile duct injury near the hepatic bifurcation 12/27  Per ADL, pt took % of lunch and dinner yesterday and % of Supplement drink with each meal.       Problem: Nutritional:  Goal: Achieve adequate nutritional intake  Description: Diet advancement with PO >50% of meals.   Outcome: Progressing.     RD to monitor for continued adequate intake.

## 2023-12-29 NOTE — PROGRESS NOTES
Assumed care at 1845. Pt resting in bed. A&ox 4  Ambulating frequently in hallways  O2 on RA  C/o abd pain 5/10. Denies any needs for pain rx.   Abd incision CDI. RLQ michelle drain. +mod amount of bilious output  Voiding adequately  Abd soft. +BS. Denies flatus or bm yet  Tolerating full liq diet.   Iv abx continued  Call light within reach. Hourly rounding in place

## 2023-12-29 NOTE — PROGRESS NOTES
Bedside report received.  Assessment complete.  A&O x 4. Patient calls appropriately.  Patient ambulates with no assist.    Patient has 6/10 pain. Pain managed with prescribed medications.  Denies N&V. Tolerating full liquid diet.  RUQ incision with island dressing, CDI. RLQ LILIAN drain with dressing, CDI. X 3 lap sites with dressing, CDI   + void, - flatus, - BM. Abdomen soft, tender, slightly distended  Patient denies SOB.  SCD's refused, patient ambulatory  Patient primarily Turkmen speaking.    Review plan with of care with patient. Call light and personal belongings within reach. Hourly rounding in place. All needs met at this time.

## 2023-12-29 NOTE — PROGRESS NOTES
"  DATE: 12/29/2023    Post Operative Day  2 open cholecystectomy Orquidea-en-Y hepaticojejunostomy.    INTERVAL EVENTS:  Continued abdominal discomfort  No flatus  Tolerating minimal clears  Ambulating room   IV infusing  Drain 775 - bilious     PHYSICAL EXAMINATION:  Vital Signs: BP 97/63   Pulse 100   Temp 36.8 °C (98.2 °F) (Temporal)   Resp 16   Ht 1.651 m (5' 5\")   Wt 85.5 kg (188 lb 7.9 oz)   SpO2 94%   A&O x 4  Respiratory rate even and unlabored  Semi firm  Distended   Tenderness with palpation in the RUQ  Dressing clean and intact - some shadowing   LILIAN RLQ with bilious drainage      LABORATORY VALUES:  Recent Labs     12/27/23  0538 12/28/23  0330 12/29/23  0018   WBC 17.8* 17.8* 15.3*   RBC 4.08* 3.93* 3.47*   HEMOGLOBIN 11.4* 11.1* 9.6*   HEMATOCRIT 35.1* 33.6* 30.5*   MCV 86.0 85.5 87.9   MCH 27.9 28.2 27.7   MCHC 32.5 33.0 31.5*   RDW 45.0 45.5 47.1   PLATELETCT 366 412 372   MPV 9.7 9.4 9.4     Recent Labs     12/27/23  0538 12/28/23  0330 12/29/23  0018   SODIUM 137 137 135   POTASSIUM 4.1 4.3 3.6   CHLORIDE 103 102 100   CO2 23 25 25   GLUCOSE 109* 123* 105*   BUN 13 13 17   CREATININE 0.54 0.58 0.84   CALCIUM 8.3* 7.9* 7.9*     Recent Labs     12/26/23  1408 12/27/23  0538 12/28/23  0330 12/29/23  0018   ASTSGOT  --  33 92* 45   ALTSGPT  --  97* 136* 99*   TBILIRUBIN  --  1.7* 1.4 1.2   ALKPHOSPHAT  --  364* 251* 209*   GLOBULIN  --  4.0* 3.4 3.3   INR 1.20*  --   --   --      Recent Labs     12/26/23  1408   INR 1.20*        IMAGING:  [id number] VIEW   Final Result      1.  No radiographic evidence of a common bile duct stent.      [id number]   Final Result      Portable fluoroscopic images obtained in the OR for intraoperative cholangiogram.      [id number] [id number]   Final Result      Digitized intraoperative radiograph is submitted for review.  This examination is not for diagnostic purpose but for guidance during a surgical procedure. Please see the patient's chart for full " procedural details.      OUTSIDE IMAGES-CT ABDOMEN /PELVIS   Final Result          ASSESSMENT AND PLAN:  * Cholangitis- (present on admission)  Assessment & Plan  12/27 Open cholecystectomy with Orquidea-en-Y hepaticojejunostomy.  LILIAN drain.  ACS Blue    Awaiting return of GI function  Leave drain  Clears until passing gas        ____________________________________     NATASHA Griffin.    DD: 12/29/2023  9:26 AM

## 2023-12-30 ENCOUNTER — PHARMACY VISIT (OUTPATIENT)
Dept: PHARMACY | Facility: MEDICAL CENTER | Age: 53
End: 2023-12-30
Payer: COMMERCIAL

## 2023-12-30 VITALS
BODY MASS INDEX: 31.4 KG/M2 | RESPIRATION RATE: 18 BRPM | TEMPERATURE: 98.8 F | SYSTOLIC BLOOD PRESSURE: 114 MMHG | OXYGEN SATURATION: 96 % | HEIGHT: 65 IN | WEIGHT: 188.49 LBS | HEART RATE: 96 BPM | DIASTOLIC BLOOD PRESSURE: 74 MMHG

## 2023-12-30 LAB
ALBUMIN SERPL BCP-MCNC: 2.5 G/DL (ref 3.2–4.9)
ALBUMIN/GLOB SERPL: 0.7 G/DL
ALP SERPL-CCNC: 167 U/L (ref 30–99)
ALT SERPL-CCNC: 71 U/L (ref 2–50)
ANION GAP SERPL CALC-SCNC: 9 MMOL/L (ref 7–16)
AST SERPL-CCNC: 28 U/L (ref 12–45)
BACTERIA UR CULT: NORMAL
BASOPHILS # BLD AUTO: 0.4 % (ref 0–1.8)
BASOPHILS # BLD: 0.05 K/UL (ref 0–0.12)
BILIRUB SERPL-MCNC: 1 MG/DL (ref 0.1–1.5)
BUN SERPL-MCNC: 8 MG/DL (ref 8–22)
CALCIUM ALBUM COR SERPL-MCNC: 9.1 MG/DL (ref 8.5–10.5)
CALCIUM SERPL-MCNC: 7.9 MG/DL (ref 8.5–10.5)
CHLORIDE SERPL-SCNC: 100 MMOL/L (ref 96–112)
CO2 SERPL-SCNC: 26 MMOL/L (ref 20–33)
CREAT SERPL-MCNC: 0.56 MG/DL (ref 0.5–1.4)
EOSINOPHIL # BLD AUTO: 0.43 K/UL (ref 0–0.51)
EOSINOPHIL NFR BLD: 3.2 % (ref 0–6.9)
ERYTHROCYTE [DISTWIDTH] IN BLOOD BY AUTOMATED COUNT: 45 FL (ref 35.9–50)
GFR SERPLBLD CREATININE-BSD FMLA CKD-EPI: 118 ML/MIN/1.73 M 2
GLOBULIN SER CALC-MCNC: 3.5 G/DL (ref 1.9–3.5)
GLUCOSE SERPL-MCNC: 134 MG/DL (ref 65–99)
HCT VFR BLD AUTO: 28.2 % (ref 42–52)
HGB BLD-MCNC: 9.3 G/DL (ref 14–18)
IMM GRANULOCYTES # BLD AUTO: 0.26 K/UL (ref 0–0.11)
IMM GRANULOCYTES NFR BLD AUTO: 1.9 % (ref 0–0.9)
LYMPHOCYTES # BLD AUTO: 1.98 K/UL (ref 1–4.8)
LYMPHOCYTES NFR BLD: 14.8 % (ref 22–41)
MAGNESIUM SERPL-MCNC: 2 MG/DL (ref 1.5–2.5)
MCH RBC QN AUTO: 28.4 PG (ref 27–33)
MCHC RBC AUTO-ENTMCNC: 33 G/DL (ref 32.3–36.5)
MCV RBC AUTO: 86 FL (ref 81.4–97.8)
MONOCYTES # BLD AUTO: 1.25 K/UL (ref 0–0.85)
MONOCYTES NFR BLD AUTO: 9.3 % (ref 0–13.4)
NEUTROPHILS # BLD AUTO: 9.43 K/UL (ref 1.82–7.42)
NEUTROPHILS NFR BLD: 70.4 % (ref 44–72)
NRBC # BLD AUTO: 0 K/UL
NRBC BLD-RTO: 0 /100 WBC (ref 0–0.2)
PHOSPHATE SERPL-MCNC: 3.1 MG/DL (ref 2.5–4.5)
PLATELET # BLD AUTO: 435 K/UL (ref 164–446)
PMV BLD AUTO: 8.9 FL (ref 9–12.9)
POTASSIUM SERPL-SCNC: 3.2 MMOL/L (ref 3.6–5.5)
PROT SERPL-MCNC: 6 G/DL (ref 6–8.2)
RBC # BLD AUTO: 3.28 M/UL (ref 4.7–6.1)
SIGNIFICANT IND 70042: NORMAL
SITE SITE: NORMAL
SODIUM SERPL-SCNC: 135 MMOL/L (ref 135–145)
SOURCE SOURCE: NORMAL
WBC # BLD AUTO: 13.4 K/UL (ref 4.8–10.8)

## 2023-12-30 PROCEDURE — 700102 HCHG RX REV CODE 250 W/ 637 OVERRIDE(OP): Performed by: GENERAL PRACTICE

## 2023-12-30 PROCEDURE — 700111 HCHG RX REV CODE 636 W/ 250 OVERRIDE (IP): Mod: JZ | Performed by: STUDENT IN AN ORGANIZED HEALTH CARE EDUCATION/TRAINING PROGRAM

## 2023-12-30 PROCEDURE — 700102 HCHG RX REV CODE 250 W/ 637 OVERRIDE(OP): Performed by: STUDENT IN AN ORGANIZED HEALTH CARE EDUCATION/TRAINING PROGRAM

## 2023-12-30 PROCEDURE — RXMED WILLOW AMBULATORY MEDICATION CHARGE: Performed by: GENERAL PRACTICE

## 2023-12-30 PROCEDURE — A9270 NON-COVERED ITEM OR SERVICE: HCPCS | Performed by: STUDENT IN AN ORGANIZED HEALTH CARE EDUCATION/TRAINING PROGRAM

## 2023-12-30 PROCEDURE — 85025 COMPLETE CBC W/AUTO DIFF WBC: CPT

## 2023-12-30 PROCEDURE — 700102 HCHG RX REV CODE 250 W/ 637 OVERRIDE(OP): Performed by: NURSE PRACTITIONER

## 2023-12-30 PROCEDURE — 700105 HCHG RX REV CODE 258: Performed by: STUDENT IN AN ORGANIZED HEALTH CARE EDUCATION/TRAINING PROGRAM

## 2023-12-30 PROCEDURE — 83735 ASSAY OF MAGNESIUM: CPT

## 2023-12-30 PROCEDURE — 99239 HOSP IP/OBS DSCHRG MGMT >30: CPT | Performed by: GENERAL PRACTICE

## 2023-12-30 PROCEDURE — A9270 NON-COVERED ITEM OR SERVICE: HCPCS | Performed by: SPECIALIST

## 2023-12-30 PROCEDURE — 700102 HCHG RX REV CODE 250 W/ 637 OVERRIDE(OP): Performed by: SPECIALIST

## 2023-12-30 PROCEDURE — 99024 POSTOP FOLLOW-UP VISIT: CPT | Performed by: NURSE PRACTITIONER

## 2023-12-30 PROCEDURE — 84100 ASSAY OF PHOSPHORUS: CPT

## 2023-12-30 PROCEDURE — A9270 NON-COVERED ITEM OR SERVICE: HCPCS | Performed by: NURSE PRACTITIONER

## 2023-12-30 PROCEDURE — A9270 NON-COVERED ITEM OR SERVICE: HCPCS | Performed by: GENERAL PRACTICE

## 2023-12-30 PROCEDURE — 99024 POSTOP FOLLOW-UP VISIT: CPT | Performed by: SURGERY

## 2023-12-30 PROCEDURE — 80053 COMPREHEN METABOLIC PANEL: CPT

## 2023-12-30 RX ORDER — FAMOTIDINE 20 MG/1
20 TABLET, FILM COATED ORAL 2 TIMES DAILY
Qty: 60 TABLET | Status: SHIPPED
Start: 2023-12-30

## 2023-12-30 RX ORDER — GABAPENTIN 100 MG/1
100 CAPSULE ORAL 3 TIMES DAILY
Qty: 90 CAPSULE | Refills: 0 | Status: SHIPPED | OUTPATIENT
Start: 2023-12-30 | End: 2024-01-29

## 2023-12-30 RX ORDER — LACTULOSE 20 G/30ML
45 SOLUTION ORAL EVERY 6 HOURS
Status: DISCONTINUED | OUTPATIENT
Start: 2023-12-30 | End: 2023-12-30

## 2023-12-30 RX ORDER — BISACODYL 10 MG
10 SUPPOSITORY, RECTAL RECTAL ONCE
Status: DISCONTINUED | OUTPATIENT
Start: 2023-12-30 | End: 2023-12-30

## 2023-12-30 RX ORDER — OXYCODONE HYDROCHLORIDE 10 MG/1
10 TABLET ORAL EVERY 6 HOURS PRN
Qty: 12 TABLET | Refills: 0 | Status: SHIPPED | OUTPATIENT
Start: 2023-12-30 | End: 2024-01-02

## 2023-12-30 RX ORDER — METHOCARBAMOL 750 MG/1
750 TABLET, FILM COATED ORAL 3 TIMES DAILY
Qty: 21 TABLET | Refills: 0 | Status: SHIPPED | OUTPATIENT
Start: 2023-12-30 | End: 2024-01-06

## 2023-12-30 RX ORDER — AMOXICILLIN AND CLAVULANATE POTASSIUM 875; 125 MG/1; MG/1
1 TABLET, FILM COATED ORAL 2 TIMES DAILY
Qty: 16 TABLET | Refills: 0 | Status: ACTIVE | OUTPATIENT
Start: 2023-12-30 | End: 2024-01-07

## 2023-12-30 RX ADMIN — GABAPENTIN 100 MG: 100 CAPSULE ORAL at 06:48

## 2023-12-30 RX ADMIN — ACETAMINOPHEN 1000 MG: 500 TABLET ORAL at 06:48

## 2023-12-30 RX ADMIN — PIPERACILLIN AND TAZOBACTAM 3.38 G: 3; .375 INJECTION, POWDER, FOR SOLUTION INTRAVENOUS at 06:50

## 2023-12-30 RX ADMIN — FAMOTIDINE 20 MG: 20 TABLET ORAL at 06:48

## 2023-12-30 RX ADMIN — METHOCARBAMOL 750 MG: 750 TABLET ORAL at 06:48

## 2023-12-30 RX ADMIN — DOCUSATE SODIUM 50 MG AND SENNOSIDES 8.6 MG 2 TABLET: 8.6; 5 TABLET, FILM COATED ORAL at 06:48

## 2023-12-30 RX ADMIN — PIPERACILLIN AND TAZOBACTAM 3.38 G: 3; .375 INJECTION, POWDER, FOR SOLUTION INTRAVENOUS at 00:31

## 2023-12-30 RX ADMIN — OXYCODONE HYDROCHLORIDE 10 MG: 10 TABLET ORAL at 09:33

## 2023-12-30 ASSESSMENT — PAIN DESCRIPTION - PAIN TYPE
TYPE: ACUTE PAIN
TYPE: ACUTE PAIN

## 2023-12-30 NOTE — PROGRESS NOTES
Surgical Progress Note    Author: Gabriel Olivo M.D. Date & Time created: 2023   9:14 AM     Interval Events:  Patient overall feels better.  Drain with same amount of bilious output.  Had a bowel movement  Tolerating a diet  Leukocytosis improving      Hemodynamics:  Temp (24hrs), Av.2 °C (98.9 °F), Min:36.9 °C (98.4 °F), Max:37.6 °C (99.7 °F)  Temperature: 37.1 °C (98.8 °F)  Pulse  Av.4  Min: 77  Max: 103   Blood Pressure: 114/74     Respiratory:    Respiration: 18, Pulse Oximetry: 96 %           Neuro:  GCS       Fluids:    Intake/Output Summary (Last 24 hours) at 2023 0914  Last data filed at 2023 0740  Gross per 24 hour   Intake 1100 ml   Output 845 ml   Net 255 ml        Current Diet Order   Procedures    Diet Order Diet: Regular     Physical Exam  Constitutional:       Appearance: He is obese.   HENT:      Head: Normocephalic and atraumatic.   Eyes:      General: No scleral icterus.  Abdominal:      Palpations: Abdomen is soft.      Tenderness: There is abdominal tenderness.   Neurological:      Mental Status: He is alert.       Labs:  No results found for this or any previous visit (from the past 24 hour(s)).  Medical Decision Making, by Problem:  Active Hospital Problems    Diagnosis     Cholangitis [K83.09]      Priority: High    Chronic idiopathic constipation [K59.04]     Sepsis with acute liver failure without hepatic coma or septic shock (HCC) [A41.9, R65.20, K72.00]     Class 1 obesity due to excess calories with body mass index (BMI) of 31.0 to 31.9 in adult [E66.09, Z68.31]     Neutrophilic leukocytosis [D72.9]     Elevated LFTs [R79.89]     Hypokalemia [E87.6]     Acute biliary pancreatitis without infection or necrosis [K85.10]      Plan:  Overall the patient clinically is improved.  Still has a bile leak, postop day 3.  My hope is that this will resolve on its own.  We will follow once he is discharged.        Discussed patient condition with RN and Patient

## 2023-12-30 NOTE — PROGRESS NOTES
"  DATE: 12/30/2023    Post Operative Day  3 open cholecystectomy Orquidea-en-Y hepaticojejunostomy.    INTERVAL EVENTS:  (+) BM  Tolerating regular diet  Drain 895 cc - bilious   Adequate pain control   Zosyn day # 5  On Lovenox  Feels much better today   Mobilizing     PHYSICAL EXAMINATION:  Vital Signs: /74   Pulse 96   Temp 37.1 °C (98.8 °F) (Temporal)   Resp 18   Ht 1.651 m (5' 5\")   Wt 85.5 kg (188 lb 7.9 oz)   SpO2 96%     A&O x 4  Respiratory rate even and unlabored  Semi firm  Rotund  Tenderness with palpation in the RUQ  Dressing clean and intact   LILIAN RLQ with bilious drainage    ASSESSMENT AND PLAN:  * Cholangitis- (present on admission)  Assessment & Plan  12/27 Open cholecystectomy with Orquidea-en-Y hepaticojejunostomy.  LILIAN drain.  ACS Blue    Patient scheduled to return to Dr. Olivo's office on 1/10 for follow up on drain and staple removal.   Plan to leave drain on discharge   Progressing        ____________________________________     LAILA Griffin    DD: 12/30/2023  8:31 AM     "

## 2023-12-30 NOTE — PROGRESS NOTES
Educated on discharge instructions. All concerns/ questions addressed. Signed copy scanned to [facility], copy of discharge instructions provided to patient. Educated on prescribed medications sent to pharmacy at [facility]. Prescribed medications given to patient prior to DC. Pt discharged without incident.

## 2023-12-30 NOTE — PROGRESS NOTES
Assumed care at 1845. Pt resting in bed. A&ox 4  Primarily Arabic speaking  Abd incision CDI. RQ michelle with high bilious output  Tolerated regular dinner. Denies n/v  Abd distended. Hypoactive BS. Lactulose given. Denies flatus or bm  Ambulating frequently to BR and hallways  O2 on RA  Pain controlled  Iv abx continued  Voiding adequately  Call light within reach. Hourly rounding in place

## 2023-12-30 NOTE — DISCHARGE SUMMARY
Discharge Summary    CHIEF COMPLAINT ON ADMISSION  No chief complaint on file.      Reason for Admission  Cholecystitis, pancreatitis, trans*     Admission Date  12/26/2023    CODE STATUS  Full Code    HPI & HOSPITAL COURSE  This is a 53-year-old male with PMHx of obesity who was transferred to our facility with severe abdominal pain, significant for cholangitis and gallstone pancreatitis.     Lipase at outside facility was 6000, noted obstructive liver test patterns.  On admission, GI was consulted patient underwent ERCP with biliary sphincterotomy, calculi removal, biliary stent insertion 12/26.  CBD stent was removed during surgery.    General surgery was consulted, patient underwent laparoscopic cholecystectomy converted to open cholecystectomy with hepaticojejunostomy for proximal common bile duct injury near the hepatic bifurcation 12/27.  Patient to follow-up with hepatobiliary surgery on January 10 for drain and staple removal.    Therefore, he is discharged in good and stable condition to home with close outpatient follow-up.    The patient met 2-midnight criteria for an inpatient stay at the time of discharge.    Discharge Date  12/30/2023    FOLLOW UP ITEMS POST DISCHARGE  Primary care physician  Hepatobiliary surgery    DISCHARGE DIAGNOSES  Principal Problem:    Cholangitis (POA: Yes)  Active Problems:    Acute biliary pancreatitis without infection or necrosis (POA: Yes)    Sepsis with acute liver failure without hepatic coma or septic shock (HCC) (POA: Yes)    Class 1 obesity due to excess calories with body mass index (BMI) of 31.0 to 31.9 in adult (POA: Yes)    Neutrophilic leukocytosis (POA: Unknown)    Elevated LFTs (POA: Unknown)    Hypokalemia (POA: Unknown)    Chronic idiopathic constipation (POA: Unknown)  Resolved Problems:    * No resolved hospital problems. *      FOLLOW UP  Future Appointments   Date Time Provider Department Center   1/10/2024  2:00 PM Gabriel Olivo M.D. [facility] None      [facility] - [place] [id number] [facility] [id number] [phone]  Follow up in 4 week(s)  make an appointment, need stent to be removed    [facility]  [address] [facility] ([place], [place]) [id number] [phone]  Follow up in 4 week(s)  make an appointment, need stent to be removed    Gabriel Olivo M.D.  [address] [facility] [address]  [facility] [address]  [phone]    Go on 1/10/2024  2 PM      MEDICATIONS ON DISCHARGE     Medication List        START taking these medications        Instructions   amoxicillin-clavulanate 875-125 MG Tabs  Commonly known as: Augmentin   Cutler 1 tableta por vía oral 2 veces al día por 8 días.  (Take 1 Tablet by mouth 2 times a day for 8 days.)  Dose: 1 Tablet     famotidine 20 MG Tabs  Commonly known as: Pepcid   Take 1 Tablet by mouth 2 times a day.  Dose: 20 mg     gabapentin 100 MG Caps  Commonly known as: Neurontin   Take 1 Capsule by mouth 3 times a day for 30 days.  Dose: 100 mg     methocarbamol 750 MG Tabs  Commonly known as: Robaxin   Take 1 Tablet by mouth 3 times a day for 7 days.  Dose: 750 mg     oxyCODONE immediate release 10 MG immediate release tablet  Commonly known as: Roxicodone   Take 1 Tablet by mouth every 6 hours as needed for Severe Pain for up to 3 days.  Dose: 10 mg              Allergies  No Known Allergies    DIET  Orders Placed This Encounter   Procedures    Diet Order Diet: Regular     Standing Status:   Standing     Number of Occurrences:   1     Order Specific Question:   Diet:     Answer:   Regular [1]       ACTIVITY  As tolerated.  Weight bearing as tolerated    CONSULTATIONS  GI  Hepatobiliary surgery    PROCEDURES  ERCP with biliary sphincterotomy, calculi removal, biliary stent insertion 12/26   laparoscopic cholecystectomy converted to open cholecystectomy with hepaticojejunostomy for proximal common bile duct injury near the hepatic bifurcation 12/27, CBD stent removal.    LABORATORY  Lab  Results   Component Value Date    SODIUM 135 12/30/2023    POTASSIUM 3.2 (L) 12/30/2023    CHLORIDE 100 12/30/2023    CO2 26 12/30/2023    GLUCOSE 134 (H) 12/30/2023    BUN 8 12/30/2023    CREATININE 0.56 12/30/2023        Lab Results   Component Value Date    WBC 13.4 (H) 12/30/2023    HEMOGLOBIN 9.3 (L) 12/30/2023    HEMATOCRIT 28.2 (L) 12/30/2023    PLATELETCT 435 12/30/2023      [id number] VIEW   Final Result      1.  No radiographic evidence of a common bile duct stent.      [id number]   Final Result      Portable fluoroscopic images obtained in the OR for intraoperative cholangiogram.      [id number] [id number]   Final Result      Digitized intraoperative radiograph is submitted for review.  This examination is not for diagnostic purpose but for guidance during a surgical procedure. Please see the patient's chart for full procedural details.      OUTSIDE IMAGES-CT ABDOMEN /PELVIS   Final Result         Total time of the discharge process exceeds 45 minutes.

## 2023-12-30 NOTE — CARE PLAN
The patient is Stable - Low risk of patient condition declining or worsening    Shift Goals  Clinical Goals: pain control  Patient Goals: Pain, Rest  Family Goals: Rest    Progress made toward(s) clinical / shift goals:  pain controlled. Still with no BM. Lactulose given. Ambulating frequently    Patient is not progressing towards the following goals:    Problem: Knowledge Deficit - Standard  Goal: Patient and family/care givers will demonstrate understanding of plan of care, disease process/condition, diagnostic tests and medications  Outcome: Progressing     Problem: Pain - Standard  Goal: Alleviation of pain or a reduction in pain to the patient’s comfort goal  Outcome: Progressing

## 2023-12-30 NOTE — PROGRESS NOTES
Hospital Medicine Daily Progress Note    Date of Service  12/29/2023    Chief Complaint  Sharath Gupta is a 53 y.o. male admitted 12/26/2023 with abdominal pain    Hospital Course  This is a 53-year-old male with PMHx of obesity who was transferred to our facility with severe abdominal pain, significant for cholangitis and gallstone pancreatitis.     Lipase at outside facility was 6000, noted obstructive liver test patterns.  On admission, GI was consulted patient underwent ERCP with biliary sphincterotomy, calculi removal, biliary stent insertion 12/26.  CBD stent was removed during surgery.    General surgery was consulted, patient underwent laparoscopic cholecystectomy converted to open cholecystectomy with hepaticojejunostomy for proximal common bile duct injury near the hepatic bifurcation 12/27.  Patient to follow-up with hepatobiliary surgery on January 10 for drain and staple removal.    Interval Problem Update  LILIAN drain in place, he is to follow-up with hepatobiliary surgery on 1/10 for drain and staple removal. Nursing staff to instruct patient on how to maintain, manage and drain LILIAN drain.    Patient tolerating clear liquid diet, advance as tolerated.      Patient with no bowel movement or flatus.  Continue with aggressive bowel regimen, lactulose ordered.    Continue with Zosyn at this time.    Patient's wife at bedside, updated on plan of care, all questions answered.    Once patient has return of bowel function, can discharge home.    I have discussed this patient's plan of care and discharge plan at IDT rounds today with Case Management, Nursing, Nursing leadership, and other members of the IDT team.    Consultants/Specialty  general surgery, GI, and hepatobiliary surgery    Code Status  Full Code    Disposition  The patient is not medically cleared for discharge to home or a post-acute facility.  Anticipate discharge to: home with close outpatient follow-up    I have placed the appropriate orders  for post-discharge needs.    Review of Systems  Review of Systems   All other systems reviewed and are negative.       Physical Exam  Temp:  [36.8 °C (98.2 °F)-37.3 °C (99.1 °F)] 37.3 °C (99.1 °F)  Pulse:  [] 99  Resp:  [16] 16  BP: ()/(60-68) 94/60  SpO2:  [91 %-94 %] 93 %    Physical Exam  Vitals and nursing note reviewed.   Constitutional:       General: He is not in acute distress.  HENT:      Head: Normocephalic and atraumatic.   Eyes:      Extraocular Movements: Extraocular movements intact.      Conjunctiva/sclera: Conjunctivae normal.      Pupils: Pupils are equal, round, and reactive to light.   Cardiovascular:      Rate and Rhythm: Normal rate and regular rhythm.      Pulses: Normal pulses.      Heart sounds: No murmur heard.     No friction rub. No gallop.   Pulmonary:      Effort: Pulmonary effort is normal. No respiratory distress.      Breath sounds: Normal breath sounds. No wheezing, rhonchi or rales.   Abdominal:      General: Bowel sounds are normal. There is no distension.      Palpations: Abdomen is soft.      Tenderness: There is no abdominal tenderness.      Comments: Surgical site with no evidence of erythema or cellulitis, LILIAN drain in place with bilious output   Musculoskeletal:         General: No swelling or tenderness. Normal range of motion.      Cervical back: Normal range of motion and neck supple. No muscular tenderness.      Right lower leg: No edema.      Left lower leg: No edema.   Skin:     General: Skin is warm and dry.      Capillary Refill: Capillary refill takes less than 2 seconds.      Findings: No bruising, erythema or rash.   Neurological:      General: No focal deficit present.      Mental Status: He is alert and oriented to person, place, and time.         Fluids    Intake/Output Summary (Last 24 hours) at 12/29/2023 2015  Last data filed at 12/29/2023 1706  Gross per 24 hour   Intake 1262.81 ml   Output 685 ml   Net 577.81 ml       Laboratory  Recent Labs      12/27/23  0538 12/28/23  0330 12/29/23  0018   WBC 17.8* 17.8* 15.3*   RBC 4.08* 3.93* 3.47*   HEMOGLOBIN 11.4* 11.1* 9.6*   HEMATOCRIT 35.1* 33.6* 30.5*   MCV 86.0 85.5 87.9   MCH 27.9 28.2 27.7   MCHC 32.5 33.0 31.5*   RDW 45.0 45.5 47.1   PLATELETCT 366 412 372   MPV 9.7 9.4 9.4     Recent Labs     12/27/23  0538 12/28/23  0330 12/29/23  0018   SODIUM 137 137 135   POTASSIUM 4.1 4.3 3.6   CHLORIDE 103 102 100   CO2 23 25 25   GLUCOSE 109* 123* 105*   BUN 13 13 17   CREATININE 0.54 0.58 0.84   CALCIUM 8.3* 7.9* 7.9*                     Imaging  [id number] VIEW   Final Result      1.  No radiographic evidence of a common bile duct stent.      [id number]   Final Result      Portable fluoroscopic images obtained in the OR for intraoperative cholangiogram.      [id number] DUCTS   Final Result      Digitized intraoperative radiograph is submitted for review.  This examination is not for diagnostic purpose but for guidance during a surgical procedure. Please see the patient's chart for full procedural details.      OUTSIDE IMAGES-CT ABDOMEN /PELVIS   Final Result           Assessment/Plan  * Cholangitis- (present on admission)  Assessment & Plan  On admission, GI was consulted patient underwent ERCP with biliary sphincterotomy, calculi removal, biliary stent insertion 12/26.  Stent removed during surgery.    General surgery was consulted, patient underwent laparoscopic cholecystectomy converted to open cholecystectomy with hepaticojejunostomy for proximal common bile duct injury near the hepatic bifurcation 12/27.    LILIAN drain in place, he is to follow-up with hepatobiliary surgery on 1/10 for drain and staple removal. Nursing staff to instruct patient on how to maintain, manage and drain LILIAN drain.    Zosyn     Acute biliary pancreatitis without infection or necrosis- (present on admission)  Assessment & Plan  On admission, GI was consulted patient underwent ERCP with biliary sphincterotomy,  calculi removal, biliary stent insertion 12/26.      Case discussed with GI, stent was removed during surgery.  X-ray imaging confirmed removal.    Chronic idiopathic constipation  Assessment & Plan  Aggressive bowel regimen   Ambulation  Dulcolax x 1 ordered 12/29  Lactulose ordered 12/29    Hypokalemia  Assessment & Plan  Labs reviewed, resolved  Serial BMP, magnesium, phosphorus levels ordered for tomorrow morning to continue to monitor electrolytes     Elevated LFTs  Assessment & Plan  Elevated LFTs in the setting of cholangitis, gallstone pancreatitis  Serial CMP  Improving      Neutrophilic leukocytosis  Assessment & Plan  Secondary to ascending cholangitis    Class 1 obesity due to excess calories with body mass index (BMI) of 31.0 to 31.9 in adult- (present on admission)  Assessment & Plan  BMI 31.84, patient was counselled on healthy diet and lifestyle and risk of cardiovascular diseases due to obesity.    Sepsis with acute liver failure without hepatic coma or septic shock (HCC)- (present on admission)  Assessment & Plan  This is Sepsis Present on admission  SIRS criteria identified on my evaluation include:   Fever, with temperature greater than 100.9 deg F, Tachycardia, with heart rate greater than 90 BPM, and Leukocytosis, with WBC greater than 12,000  Clinical indicators of end organ dysfunction include Acute Liver Failure, with bilirubin greater than 2  Source is cholangitis  Sepsis protocol initiated  Crystalloid Fluid Administration: At outside facility  IV antibiotics as appropriate for source of sepsis  Reassessment: I have reassessed the patient's hemodynamic status  Blood cultures drawn at outside facility, did not follow  Started on Zosyn 12/26/23         VTE prophylaxis: Lovenox    I have performed a physical exam and reviewed and updated ROS and Plan today (12/29/2023). In review of yesterday's note (12/28/2023), there are no changes except as documented above.

## 2023-12-30 NOTE — PROGRESS NOTES
Drain and dressing change teaching provided to wife. Wife verbalizes understanding and states she is willing to provided this care. DC lounge ordered per protocol with active dc order in place

## 2023-12-30 NOTE — DISCHARGE INSTRUCTIONS
PLEASE RECORD HOW MUCH OUTPUT YOU HAVE DAILY AND KEEP A LOG TO BRING TO SURGEON on 1/10/2023    REGISTRE CUÁNTO EFECTO TIENE DIARIO Y MANTENGA UN REGISTRO PARA LLEVARLO AL CIRUJANO el 10/01/2023

## 2024-01-03 NOTE — PROGRESS NOTES
Subjective:   1/10/2024  1:57 PM  Primary care physician: No primary care provider on file.  Referring Provider: Dr Queen    Chief Complaint: Bile duct injury    Diagnosis:   1. Cholangitis        2. Elevated LFTs        3. Acute biliary pancreatitis without infection or necrosis        4. Sepsis with acute liver failure without hepatic coma or septic shock, due to unspecified organism (HCC)        5. Class 1 obesity due to excess calories without serious comorbidity with body mass index (BMI) of 31.0 to 31.9 in adult        6. Injury of bile duct, subsequent encounter Active CBC WITH DIFFERENTIAL    Comp Metabolic Panel    traMADol (ULTRAM) 50 MG Tab        History of presenting illness:    Sharath Gupta is a pleasant 53 y.o. male who presented with obstructive jaundice.  He apparently was transferred from [facility].  He is currently postoperative ERCP.  He was found to have choledocholithiasis without evidence described of cholangitis.  His bilirubin was elevated along with his liver function test. His white count is 27,000.  Patient is unable to provide any historical information. Dr Queen performed an laparoscopic to open cholecystectomy, involving bile duct injury. Dr Olivo called to assist urgently in OR, and provided hepaticojejunostomy repair. Patient eventually discharged home on 12/30/23 with LILIAN drain and staples in place.    Update 1/10/24    He is here today for further evaluation.  He is overall feeling better.  He still has some incisional pain.  His drain is only been putting out 20 cc of serosanguineous fluid a day.  No bile for 4 days.  Denies fever shakes or chills.    History reviewed. No pertinent past medical history.  Past Surgical History:   Procedure Laterality Date    JUAN DIEGO BY LAPAROSCOPY N/A 12/27/2023    Procedure: CHOLECYSTECTOMY, LAPAROSCOPIC CONVERTED TO OPEN;  Surgeon: Derrick Meyers M.D.;  Location: SURGERY [facility];  Service: General     HEPATICOJEJUNOSTOMY, ALIX-EN-Y N/A 2023    Procedure: HEPATICOJEJUNOSTOMY, ALIX-EN-Y;  Surgeon: Derrick Meyers M.D.;  Location: SURGERY [facility];  Service: General    [place] ERCP,DIAGNOSTIC N/A 2023    Procedure: ERCP (ENDOSCOPIC RETROGRADE CHOLANGIOPANCREATOGRAPHY);  Surgeon: Darwin Puente M.D.;  Location: SURGERY SAME DAY [facility];  Service: Gastroenterology    [place] ERCP,W/REMOVAL STONE,VAISHALI/PANCR DUCTS  2023    Procedure: ERCP, WITH CALCULUS REMOVAL FROM BILE OR PANCREATIC DUCT;  Surgeon: Darwin Puente M.D.;  Location: SURGERY SAME DAY [facility];  Service: Gastroenterology    SPHINCTEROTOMY N/A 2023    Procedure: SPHINCTEROTOMY;  Surgeon: Darwin Puente M.D.;  Location: SURGERY SAME DAY [facility];  Service: Gastroenterology    BILIARY DILATATION N/A 2023    Procedure: DILATION, STRICTURE, BILE DUCT;  Surgeon: Darwin Puente M.D.;  Location: SURGERY SAME DAY [facility];  Service: Gastroenterology    BILIARY STENT PLACEMENT N/A 2023    Procedure: INSERTION, STENT, BILE DUCT;  Surgeon: Darwin Puente M.D.;  Location: SURGERY SAME DAY [facility];  Service: Gastroenterology     No Known Allergies  Outpatient Encounter Medications as of 1/10/2024   Medication Sig Dispense Refill    traMADol (ULTRAM) 50 MG Tab Take 1-2 Tablets by mouth every 6 hours as needed for Moderate Pain for up to 10 days. 30 Tablet 0    gabapentin (NEURONTIN) 100 MG Cap Take 1 Capsule by mouth 3 times a day for 30 days. 90 Capsule 0    famotidine (PEPCID) 20 MG Tab Take 1 Tablet by mouth 2 times a day. (Patient not taking: Reported on 1/10/2024) 60 Tablet     [] amoxicillin-clavulanate (AUGMENTIN) 875-125 MG Tab Take 1 Tablet by mouth 2 times a day for 8 days. 16 Tablet 0    [] methocarbamol (ROBAXIN) 750 MG Tab Take 1 Tablet by mouth 3 times a day for 7 days. 21 Tablet 0     No facility-administered encounter medications on file as of 1/10/2024.     Social History     Socioeconomic History     "Marital status:      Spouse name: Not on file    Number of children: Not on file    Years of education: Not on file    Highest education level: Not on file   Occupational History    Not on file   Tobacco Use    Smoking status: Never     Passive exposure: Never    Smokeless tobacco: Never   Vaping Use    Vaping Use: Never used   Substance and Sexual Activity    Alcohol use: Not Currently     Comment: occasionally    Drug use: Never    Sexual activity: Not on file   Other Topics Concern    Not on file   Social History Narrative    Not on file     Social Determinants of Health     Financial Resource Strain: Not on file   Food Insecurity: Not on file   Transportation Needs: Not on file   Physical Activity: Not on file   Stress: Not on file   Social Connections: Not on file   Intimate Partner Violence: Not on file   Housing Stability: Not on file      Social History     Tobacco Use   Smoking Status Never    Passive exposure: Never   Smokeless Tobacco Never     Social History     Substance and Sexual Activity   Alcohol Use Not Currently    Comment: occasionally     Social History     Substance and Sexual Activity   Drug Use Never      History reviewed. No pertinent family history.         Objective:   /60 (BP Location: Right arm, Patient Position: Sitting, BP Cuff Size: Small adult)   Pulse (!) 103   Temp 36.2 °C (97.2 °F) (Temporal)   Ht 1.651 m (5' 5\")   Wt 81.6 kg (180 lb)   SpO2 98%   BMI 29.95 kg/m²     Physical Exam  Constitutional:       Appearance: Normal appearance. He is obese.   Eyes:      General: No scleral icterus.  Abdominal:      Palpations: Abdomen is soft.      Tenderness: There is abdominal tenderness.   Neurological:      Mental Status: He is alert.         Labs   Latest Reference Range & Units 12/30/23 09:49   WBC 4.8 - 10.8 K/uL 13.4 (H)   RBC 4.70 - 6.10 M/uL 3.28 (L)   Hemoglobin 14.0 - 18.0 g/dL 9.3 (L)   Hematocrit 42.0 - 52.0 % 28.2 (L)   MCV 81.4 - 97.8 fL 86.0   MCH 27.0 - " 33.0 pg 28.4   MCHC 32.3 - 36.5 g/dL 33.0   RDW 35.9 - 50.0 fL 45.0   Platelet Count 164 - 446 K/uL 435   MPV 9.0 - 12.9 fL 8.9 (L)   (H): Data is abnormally high  (L): Data is abnormally low     Latest Reference Range & Units 12/30/23 09:49   Sodium 135 - 145 mmol/L 135   Potassium 3.6 - 5.5 mmol/L 3.2 (L)   Chloride 96 - 112 mmol/L 100   Co2 20 - 33 mmol/L 26   Anion Gap 7.0 - 16.0  9.0   Glucose 65 - 99 mg/dL 134 (H)   Bun 8 - 22 mg/dL 8   Creatinine 0.50 - 1.40 mg/dL 0.56   GFR (CKD-EPI) >60 mL/min/1.73 m 2 118   Calcium 8.5 - 10.5 mg/dL 7.9 (L)   Correct Calcium 8.5 - 10.5 mg/dL 9.1   AST(SGOT) 12 - 45 U/L 28   ALT(SGPT) 2 - 50 U/L 71 (H)   Alkaline Phosphatase 30 - 99 U/L 167 (H)   Total Bilirubin 0.1 - 1.5 mg/dL 1.0   Albumin 3.2 - 4.9 g/dL 2.5 (L)   Total Protein 6.0 - 8.2 g/dL 6.0   Globulin 1.9 - 3.5 g/dL 3.5   A-G Ratio g/dL 0.7   Phosphorus 2.5 - 4.5 mg/dL 3.1   Magnesium 1.5 - 2.5 mg/dL 2.0   (L): Data is abnormally low  (H): Data is abnormally high     Imaging  N/A    Pathology  SURGICAL SPECIMEN (12/27/23)  FINAL DIAGNOSIS:     A. Gallbladder:          Acute on chronic necrotizing cholecystitis.          Cholelithiasis.          Negative for dysplasia and malignancy.     Procedures  Laparoscopic to open cholecystectomy - Dr Queen  Hepaticojejunostomy - Dr Olivo      Diagnosis:     1. Cholangitis        2. Elevated LFTs        3. Acute biliary pancreatitis without infection or necrosis        4. Sepsis with acute liver failure without hepatic coma or septic shock, due to unspecified organism (HCC)        5. Class 1 obesity due to excess calories without serious comorbidity with body mass index (BMI) of 31.0 to 31.9 in adult        6. Injury of bile duct, subsequent encounter Active CBC WITH DIFFERENTIAL    Comp Metabolic Panel    traMADol (ULTRAM) 50 MG Tab          Medical Decision Making:  Today's Assessment / Status / Plan:     Overall the patient is doing well.  His drain is removed without  incident in the office today.  Staples were removed and Steri-Strips were placed.    I will check a set of LFTs and a CBC today.    I will keep the patient out of work for 2 months and would like to see him back in 2 weeks.    The patient will call sooner should he have any troubles.    I, Dr Olivo, have entered, reviewed and confirmed the above diagnoses related to this patient on this date of service, as per the time and date noted at top of this note.

## 2024-01-10 ENCOUNTER — OFFICE VISIT (OUTPATIENT)
Dept: SURGICAL ONCOLOGY | Facility: MEDICAL CENTER | Age: 54
End: 2024-01-10

## 2024-01-10 ENCOUNTER — HOSPITAL ENCOUNTER (OUTPATIENT)
Dept: LAB | Facility: MEDICAL CENTER | Age: 54
End: 2024-01-10
Attending: SURGERY

## 2024-01-10 ENCOUNTER — PHARMACY VISIT (OUTPATIENT)
Dept: PHARMACY | Facility: MEDICAL CENTER | Age: 54
End: 2024-01-10
Payer: COMMERCIAL

## 2024-01-10 VITALS
SYSTOLIC BLOOD PRESSURE: 104 MMHG | HEIGHT: 65 IN | OXYGEN SATURATION: 98 % | HEART RATE: 103 BPM | DIASTOLIC BLOOD PRESSURE: 60 MMHG | WEIGHT: 180 LBS | TEMPERATURE: 97.2 F | BODY MASS INDEX: 29.99 KG/M2

## 2024-01-10 DIAGNOSIS — R79.89 ELEVATED LFTS: ICD-10-CM

## 2024-01-10 DIAGNOSIS — K85.10 ACUTE BILIARY PANCREATITIS WITHOUT INFECTION OR NECROSIS: ICD-10-CM

## 2024-01-10 DIAGNOSIS — S36.13XD INJURY OF BILE DUCT, SUBSEQUENT ENCOUNTER: ICD-10-CM

## 2024-01-10 DIAGNOSIS — K83.09 CHOLANGITIS: ICD-10-CM

## 2024-01-10 DIAGNOSIS — A41.9 SEPSIS WITH ACUTE LIVER FAILURE WITHOUT HEPATIC COMA OR SEPTIC SHOCK, DUE TO UNSPECIFIED ORGANISM (HCC): ICD-10-CM

## 2024-01-10 DIAGNOSIS — K72.00 SEPSIS WITH ACUTE LIVER FAILURE WITHOUT HEPATIC COMA OR SEPTIC SHOCK, DUE TO UNSPECIFIED ORGANISM (HCC): ICD-10-CM

## 2024-01-10 DIAGNOSIS — R65.20 SEPSIS WITH ACUTE LIVER FAILURE WITHOUT HEPATIC COMA OR SEPTIC SHOCK, DUE TO UNSPECIFIED ORGANISM (HCC): ICD-10-CM

## 2024-01-10 DIAGNOSIS — E66.09 CLASS 1 OBESITY DUE TO EXCESS CALORIES WITHOUT SERIOUS COMORBIDITY WITH BODY MASS INDEX (BMI) OF 31.0 TO 31.9 IN ADULT: ICD-10-CM

## 2024-01-10 LAB
ALBUMIN SERPL BCP-MCNC: 3.6 G/DL (ref 3.2–4.9)
ALBUMIN/GLOB SERPL: 0.9 G/DL
ALP SERPL-CCNC: 180 U/L (ref 30–99)
ALT SERPL-CCNC: 29 U/L (ref 2–50)
ANION GAP SERPL CALC-SCNC: 15 MMOL/L (ref 7–16)
AST SERPL-CCNC: 23 U/L (ref 12–45)
BASOPHILS # BLD AUTO: 1 % (ref 0–1.8)
BASOPHILS # BLD: 0.08 K/UL (ref 0–0.12)
BILIRUB SERPL-MCNC: 0.6 MG/DL (ref 0.1–1.5)
BUN SERPL-MCNC: 8 MG/DL (ref 8–22)
CALCIUM ALBUM COR SERPL-MCNC: 9.3 MG/DL (ref 8.5–10.5)
CALCIUM SERPL-MCNC: 9 MG/DL (ref 8.5–10.5)
CHLORIDE SERPL-SCNC: 101 MMOL/L (ref 96–112)
CO2 SERPL-SCNC: 24 MMOL/L (ref 20–33)
CREAT SERPL-MCNC: 0.58 MG/DL (ref 0.5–1.4)
EOSINOPHIL # BLD AUTO: 0.35 K/UL (ref 0–0.51)
EOSINOPHIL NFR BLD: 4.4 % (ref 0–6.9)
ERYTHROCYTE [DISTWIDTH] IN BLOOD BY AUTOMATED COUNT: 46.7 FL (ref 35.9–50)
GFR SERPLBLD CREATININE-BSD FMLA CKD-EPI: 117 ML/MIN/1.73 M 2
GLOBULIN SER CALC-MCNC: 3.9 G/DL (ref 1.9–3.5)
GLUCOSE SERPL-MCNC: 101 MG/DL (ref 65–99)
HCT VFR BLD AUTO: 35.4 % (ref 42–52)
HGB BLD-MCNC: 11.5 G/DL (ref 14–18)
IMM GRANULOCYTES # BLD AUTO: 0.06 K/UL (ref 0–0.11)
IMM GRANULOCYTES NFR BLD AUTO: 0.7 % (ref 0–0.9)
LYMPHOCYTES # BLD AUTO: 2.54 K/UL (ref 1–4.8)
LYMPHOCYTES NFR BLD: 31.7 % (ref 22–41)
MCH RBC QN AUTO: 28.5 PG (ref 27–33)
MCHC RBC AUTO-ENTMCNC: 32.5 G/DL (ref 32.3–36.5)
MCV RBC AUTO: 87.6 FL (ref 81.4–97.8)
MONOCYTES # BLD AUTO: 0.77 K/UL (ref 0–0.85)
MONOCYTES NFR BLD AUTO: 9.6 % (ref 0–13.4)
NEUTROPHILS # BLD AUTO: 4.21 K/UL (ref 1.82–7.42)
NEUTROPHILS NFR BLD: 52.6 % (ref 44–72)
NRBC # BLD AUTO: 0 K/UL
NRBC BLD-RTO: 0 /100 WBC (ref 0–0.2)
PLATELET # BLD AUTO: 517 K/UL (ref 164–446)
PMV BLD AUTO: 10.1 FL (ref 9–12.9)
POTASSIUM SERPL-SCNC: 4.1 MMOL/L (ref 3.6–5.5)
PROT SERPL-MCNC: 7.5 G/DL (ref 6–8.2)
RBC # BLD AUTO: 4.04 M/UL (ref 4.7–6.1)
SODIUM SERPL-SCNC: 140 MMOL/L (ref 135–145)
WBC # BLD AUTO: 8 K/UL (ref 4.8–10.8)

## 2024-01-10 PROCEDURE — RXMED WILLOW AMBULATORY MEDICATION CHARGE: Performed by: SURGERY

## 2024-01-10 PROCEDURE — 3078F DIAST BP <80 MM HG: CPT | Performed by: SURGERY

## 2024-01-10 PROCEDURE — 3074F SYST BP LT 130 MM HG: CPT | Performed by: SURGERY

## 2024-01-10 PROCEDURE — 85025 COMPLETE CBC W/AUTO DIFF WBC: CPT

## 2024-01-10 PROCEDURE — 80053 COMPREHEN METABOLIC PANEL: CPT

## 2024-01-10 PROCEDURE — 99024 POSTOP FOLLOW-UP VISIT: CPT | Performed by: SURGERY

## 2024-01-10 PROCEDURE — 36415 COLL VENOUS BLD VENIPUNCTURE: CPT

## 2024-01-10 RX ORDER — TRAMADOL HYDROCHLORIDE 50 MG/1
50-100 TABLET ORAL EVERY 6 HOURS PRN
Qty: 30 TABLET | Refills: 0 | Status: SHIPPED | OUTPATIENT
Start: 2024-01-10 | End: 2024-01-24 | Stop reason: SDUPTHER

## 2024-01-10 ASSESSMENT — FIBROSIS 4 INDEX: FIB4 SCORE: 0.4

## 2024-01-11 ENCOUNTER — TELEPHONE (OUTPATIENT)
Dept: SURGICAL ONCOLOGY | Facility: MEDICAL CENTER | Age: 54
End: 2024-01-11

## 2024-01-12 NOTE — PROGRESS NOTES
Subjective:   1/24/2024 11:33 AM  Primary care physician: Pcp Pt States None  Referring Provider: Dr Meyers    Chief Complaint:   Chief Complaint   Patient presents with    Follow-Up     INTRAOP CONSULT  CBD INJURY  HEPATICOJEJUN  2 WK F/U        Diagnosis:   1. Cholangitis        2. Elevated LFTs        3. Acute biliary pancreatitis without infection or necrosis        4. Injury of bile duct, subsequent encounter        5. Injury of bile duct, subsequent encounter Active traMADol (ULTRAM) 50 MG Tab        History of presenting illness:    Sharath Gupta is a pleasant 53 y.o. male who presented with obstructive jaundice.  He apparently was transferred from [facility].  He is currently postoperative ERCP.  He was found to have choledocholithiasis without evidence described of cholangitis.  His bilirubin was elevated along with his liver function test. His white count is 27,000.  Patient is unable to provide any historical information. Dr Queen performed an laparoscopic to open cholecystectomy, involving bile duct injury. Dr Olivo called to assist urgently in OR, and provided hepaticojejunostomy repair. Patient eventually discharged home on 12/30/23 with LILIAN drain and staples in place.     Update 1/10/24  He is here today for further evaluation. He is overall feeling better.  He still has some incisional pain. His drain is only been putting out 20 cc of serosanguineous fluid a day. No bile for 4 days.  Denies fever shakes or chills.    Update 1/24/24    He is here today for 1 month follow-up.  He is feeling well and offers no complaints.  Still has some mild incisional pain.    History reviewed. No pertinent past medical history.  Past Surgical History:   Procedure Laterality Date    JUAN DIEGO BY LAPAROSCOPY N/A 12/27/2023    Procedure: CHOLECYSTECTOMY, LAPAROSCOPIC CONVERTED TO OPEN;  Surgeon: Derrick Meyers M.D.;  Location: SURGERY [facility];  Service: General    HEPATICOJEJUNOSTOMY, ALIX-EN-Y  N/A 12/27/2023    Procedure: HEPATICOJEJUNOSTOMY, ALIX-EN-Y;  Surgeon: Derrick Meyers M.D.;  Location: SURGERY [facility];  Service: General    [place] ERCP,DIAGNOSTIC N/A 12/26/2023    Procedure: ERCP (ENDOSCOPIC RETROGRADE CHOLANGIOPANCREATOGRAPHY);  Surgeon: Darwin Puente M.D.;  Location: SURGERY SAME DAY [facility];  Service: Gastroenterology    [place] ERCP,W/REMOVAL STONE,VAISHALI/PANCR DUCTS  12/26/2023    Procedure: ERCP, WITH CALCULUS REMOVAL FROM BILE OR PANCREATIC DUCT;  Surgeon: Darwin Puente M.D.;  Location: SURGERY SAME DAY [facility];  Service: Gastroenterology    SPHINCTEROTOMY N/A 12/26/2023    Procedure: SPHINCTEROTOMY;  Surgeon: Darwin Puente M.D.;  Location: SURGERY SAME DAY [facility];  Service: Gastroenterology    BILIARY DILATATION N/A 12/26/2023    Procedure: DILATION, STRICTURE, BILE DUCT;  Surgeon: Darwin Puente M.D.;  Location: SURGERY SAME DAY [facility];  Service: Gastroenterology    BILIARY STENT PLACEMENT N/A 12/26/2023    Procedure: INSERTION, STENT, BILE DUCT;  Surgeon: Darwin Puente M.D.;  Location: SURGERY SAME DAY [facility];  Service: Gastroenterology     No Known Allergies  Outpatient Encounter Medications as of 1/24/2024   Medication Sig Dispense Refill    traMADol (ULTRAM) 50 MG Tab Take 1-2 Tablets by mouth every 6 hours as needed for Moderate Pain for up to 10 days. 30 Tablet 0    [DISCONTINUED] traMADol (ULTRAM) 50 MG Tab Take 1-2 Tablets by mouth every 6 hours as needed for Moderate Pain for up to 10 days. 30 Tablet 0    famotidine (PEPCID) 20 MG Tab Take 1 Tablet by mouth 2 times a day. (Patient not taking: Reported on 1/10/2024) 60 Tablet     gabapentin (NEURONTIN) 100 MG Cap Take 1 Capsule by mouth 3 times a day for 30 days. 90 Capsule 0     No facility-administered encounter medications on file as of 1/24/2024.     Social History     Socioeconomic History    Marital status:      Spouse name: Not on file    Number of children: Not on file    Years of education: Not on file     "Highest education level: Not on file   Occupational History    Not on file   Tobacco Use    Smoking status: Never     Passive exposure: Never    Smokeless tobacco: Never   Vaping Use    Vaping Use: Never used   Substance and Sexual Activity    Alcohol use: Not Currently     Comment: occasionally    Drug use: Never    Sexual activity: Not on file   Other Topics Concern    Not on file   Social History Narrative    Not on file     Social Determinants of Health     Financial Resource Strain: Not on file   Food Insecurity: Not on file   Transportation Needs: Not on file   Physical Activity: Not on file   Stress: Not on file   Social Connections: Not on file   Intimate Partner Violence: Not on file   Housing Stability: Not on file      Social History     Tobacco Use   Smoking Status Never    Passive exposure: Never   Smokeless Tobacco Never     Social History     Substance and Sexual Activity   Alcohol Use Not Currently    Comment: occasionally     Social History     Substance and Sexual Activity   Drug Use Never      History reviewed. No pertinent family history.         Objective:   /70 (BP Location: Right arm, Patient Position: Sitting, BP Cuff Size: Small adult)   Pulse (!) 107   Temp 37.3 °C (99.1 °F) (Temporal)   Ht 1.651 m (5' 5\")   Wt 82.1 kg (181 lb)   SpO2 94%   BMI 30.12 kg/m²     Physical Exam  Constitutional:       General: He is not in acute distress.     Appearance: Normal appearance. He is not ill-appearing.   Eyes:      General: No scleral icterus.     Pupils: Pupils are equal, round, and reactive to light.   Abdominal:      Palpations: Abdomen is soft.      Tenderness: There is no abdominal tenderness.   Neurological:      Mental Status: He is alert.         Labs    Latest Reference Range & Units 12/30/23 09:49   WBC 4.8 - 10.8 K/uL 13.4 (H)   RBC 4.70 - 6.10 M/uL 3.28 (L)   Hemoglobin 14.0 - 18.0 g/dL 9.3 (L)   Hematocrit 42.0 - 52.0 % 28.2 (L)   MCV 81.4 - 97.8 fL 86.0   MCH 27.0 - 33.0 pg " 28.4   MCHC 32.3 - 36.5 g/dL 33.0   RDW 35.9 - 50.0 fL 45.0   Platelet Count 164 - 446 K/uL 435   MPV 9.0 - 12.9 fL 8.9 (L)   (H): Data is abnormally high  (L): Data is abnormally low       Latest Reference Range & Units 12/30/23 09:49   Sodium 135 - 145 mmol/L 135   Potassium 3.6 - 5.5 mmol/L 3.2 (L)   Chloride 96 - 112 mmol/L 100   Co2 20 - 33 mmol/L 26   Anion Gap 7.0 - 16.0  9.0   Glucose 65 - 99 mg/dL 134 (H)   Bun 8 - 22 mg/dL 8   Creatinine 0.50 - 1.40 mg/dL 0.56   GFR (CKD-EPI) >60 mL/min/1.73 m 2 118   Calcium 8.5 - 10.5 mg/dL 7.9 (L)   Correct Calcium 8.5 - 10.5 mg/dL 9.1   AST(SGOT) 12 - 45 U/L 28   ALT(SGPT) 2 - 50 U/L 71 (H)   Alkaline Phosphatase 30 - 99 U/L 167 (H)   Total Bilirubin 0.1 - 1.5 mg/dL 1.0   Albumin 3.2 - 4.9 g/dL 2.5 (L)   Total Protein 6.0 - 8.2 g/dL 6.0   Globulin 1.9 - 3.5 g/dL 3.5   A-G Ratio g/dL 0.7   Phosphorus 2.5 - 4.5 mg/dL 3.1   Magnesium 1.5 - 2.5 mg/dL 2.0   (L): Data is abnormally low  (H): Data is abnormally high      Imaging  N/A     Pathology  SURGICAL SPECIMEN (12/27/23)  FINAL DIAGNOSIS:     A. Gallbladder:          Acute on chronic necrotizing cholecystitis.          Cholelithiasis.          Negative for dysplasia and malignancy.      Procedures  Laparoscopic to open cholecystectomy - Dr Queen  Hepaticojejunostomy - Dr Olivo    Diagnosis:     1. Cholangitis        2. Elevated LFTs        3. Acute biliary pancreatitis without infection or necrosis        4. Injury of bile duct, subsequent encounter        5. Injury of bile duct, subsequent encounter Active traMADol (ULTRAM) 50 MG Tab          Medical Decision Making:  Today's Assessment / Status / Plan:     The patient is doing well.  He is recovering nicely from surgery.    I will see him back in 1 month and check a set of LFTs at that point.  He will then be able to be released and returned to work.    They will call sooner should he have any troubles.    I, Dr Olivo, have entered, reviewed and confirmed the  above diagnoses related to this patient on this date of service, as per the time and date noted at top of this note.

## 2024-01-12 NOTE — TELEPHONE ENCOUNTER
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  He notes that it is too uncomfortable to sit straight  Objective: See treatment diary below      Assessment: Patient tolerated treatment well  Pt did not some discomfort with prone exercises  Spasm noted medial scapular border and paraspinals T5-T9    Plan: Continue per plan of care  Precautions: none     Daily Treatment Diary      Assessment  3/24  3/31  4/6                 Eval/Reval                       FOTO         **         **   Manuals    Gr V PA mid T     NT                 STM med to R scap   NT                       Exercise Diary     UBE    retro 5'  retro 5'                  TM    2 4  x5' w/ postural cues  2 5 x6" w/postural cues                  Thoracic ext in chair x10  5"x10 1/2 foam  5"x10 1/2 foam                  prone press up x10  5"  1x10  1x5 5"x10                  prone T, I x10  I 1x10  1x5  T  1x10  2x10 ea                  child's pose straight, R, L     10"x5                  TB ER      Gr x10                  plank      10"x3                                                                                                                                                                                                                 Modalities    MH  10'  10' seated post  Declined                                   Access Code: [email or web address]  Access Code: [email or web address]  URL: [email or web address]  Date: 2022  Prepared by: Clara Walton    Exercises  · Standard Plank - 1 x daily - 7 x weekly - 3 sets - 10 reps  · Child's Pose Stretch - 1 x daily - 7 x weekly - 3 sets - 10 reps  · Child's Pose with Spoke to patient and told him all labs came back good in [place]         ----- Message from Gabriel Olivo M.D. sent at 1/11/2024  8:55 AM PST -----  Can you call him and tell him his labs are all good?  He speaks [place] only     Sidebending - 1 x daily - 7 x weekly - 3 sets - 10 reps  · Shoulder External Rotation and Scapular Retraction with Resistance - 1 x daily - 7 x weekly - 1 sets - 10 reps - 5 sec hold      ·

## 2024-01-24 ENCOUNTER — OFFICE VISIT (OUTPATIENT)
Dept: SURGICAL ONCOLOGY | Facility: MEDICAL CENTER | Age: 54
End: 2024-01-24
Payer: MEDICAID

## 2024-01-24 VITALS
WEIGHT: 181 LBS | TEMPERATURE: 99.1 F | HEART RATE: 107 BPM | SYSTOLIC BLOOD PRESSURE: 132 MMHG | HEIGHT: 65 IN | BODY MASS INDEX: 30.16 KG/M2 | DIASTOLIC BLOOD PRESSURE: 70 MMHG | OXYGEN SATURATION: 94 %

## 2024-01-24 DIAGNOSIS — R79.89 ELEVATED LFTS: ICD-10-CM

## 2024-01-24 DIAGNOSIS — K83.09 CHOLANGITIS: ICD-10-CM

## 2024-01-24 DIAGNOSIS — K85.10 ACUTE BILIARY PANCREATITIS WITHOUT INFECTION OR NECROSIS: ICD-10-CM

## 2024-01-24 DIAGNOSIS — S36.13XD INJURY OF BILE DUCT, SUBSEQUENT ENCOUNTER: ICD-10-CM

## 2024-01-24 PROCEDURE — 3078F DIAST BP <80 MM HG: CPT | Performed by: SURGERY

## 2024-01-24 PROCEDURE — 99024 POSTOP FOLLOW-UP VISIT: CPT | Performed by: SURGERY

## 2024-01-24 PROCEDURE — 3075F SYST BP GE 130 - 139MM HG: CPT | Performed by: SURGERY

## 2024-01-24 RX ORDER — TRAMADOL HYDROCHLORIDE 50 MG/1
50-100 TABLET ORAL EVERY 6 HOURS PRN
Qty: 30 TABLET | Refills: 0 | Status: SHIPPED | OUTPATIENT
Start: 2024-01-24 | End: 2024-01-29

## 2024-01-24 ASSESSMENT — FIBROSIS 4 INDEX: FIB4 SCORE: 0.43

## 2024-01-24 NOTE — PROGRESS NOTES
Subjective:   2/21/2024 10:20 AM  Primary care physician: Pcp Pt States None  Referring Provider: Dr Meyers     Chief Complaint:   Chief Complaint   Patient presents with    Follow-Up     CBD INJURY  HEPATICOJEJUN  4 WK F/U   CMP WNL        Diagnosis:   1. Cholangitis        2. Acute biliary pancreatitis without infection or necrosis        3. Elevated LFTs        4. Injury of bile duct, subsequent encounter  Comp Metabolic Panel        History of presenting illness:    Sharath Gupta is a pleasant 53 y.o. male who presented with obstructive jaundice.  He apparently was transferred from [facility].  He is currently postoperative ERCP.  He was found to have choledocholithiasis without evidence described of cholangitis. His bilirubin was elevated along with his liver function test. His white count is 27,000.  Patient is unable to provide any historical information. Dr Queen performed an laparoscopic to open cholecystectomy, involving bile duct injury. Dr Olivo called to assist urgently in OR, and provided hepaticojejunostomy repair. Patient eventually discharged home on 12/30/23 with LILIAN drain and staples in place.     Update 1/10/24  He is here today for further evaluation. He is overall feeling better.  He still has some incisional pain. His drain is only been putting out 20 cc of serosanguineous fluid a day. No bile for 4 days. Denies fever shakes or chills.     Update 1/24/24  He is here today for 1 month follow-up. He is feeling well and offers no complaints. Still has some mild incisional pain.    Update 2/21/24  CMP on 2/16/24 was unremarkable.    He is here today for follow-up.  He has some vague incisional pain otherwise is doing well.  Liver function tests have normalized.    History reviewed. No pertinent past medical history.  Past Surgical History:   Procedure Laterality Date    JUAN DEIGO BY LAPAROSCOPY N/A 12/27/2023    Procedure: CHOLECYSTECTOMY, LAPAROSCOPIC CONVERTED TO OPEN;   Surgeon: Derrick Meyers M.D.;  Location: SURGERY [facility];  Service: General    HEPATICOJEJUNOSTOMY, ALIX-EN-Y N/A 2023    Procedure: HEPATICOJEJUNOSTOMY, ALIX-EN-Y;  Surgeon: Derrick Meyers M.D.;  Location: SURGERY [facility];  Service: General    AL ERCP,DIAGNOSTIC N/A 2023    Procedure: ERCP (ENDOSCOPIC RETROGRADE CHOLANGIOPANCREATOGRAPHY);  Surgeon: Darwin Puente M.D.;  Location: SURGERY SAME DAY [facility];  Service: Gastroenterology    AL ERCP,W/REMOVAL STONE,VAISHALI/PANCR DUCTS  2023    Procedure: ERCP, WITH CALCULUS REMOVAL FROM BILE OR PANCREATIC DUCT;  Surgeon: Darwin Puente M.D.;  Location: SURGERY SAME DAY [facility];  Service: Gastroenterology    SPHINCTEROTOMY N/A 2023    Procedure: SPHINCTEROTOMY;  Surgeon: Darwin Puente M.D.;  Location: SURGERY SAME DAY [facility];  Service: Gastroenterology    BILIARY DILATATION N/A 2023    Procedure: DILATION, STRICTURE, BILE DUCT;  Surgeon: Darwin Puente M.D.;  Location: SURGERY SAME DAY [facility];  Service: Gastroenterology    BILIARY STENT PLACEMENT N/A 2023    Procedure: INSERTION, STENT, BILE DUCT;  Surgeon: Darwin Puente M.D.;  Location: SURGERY SAME DAY [facility];  Service: Gastroenterology     No Known Allergies  Outpatient Encounter Medications as of 2024   Medication Sig Dispense Refill    [DISCONTINUED] traMADol (ULTRAM) 50 MG Tab Take 1-2 Tablets by mouth every 6 hours as needed for Moderate Pain for up to 10 days. 30 Tablet 0    famotidine (PEPCID) 20 MG Tab Take 1 Tablet by mouth 2 times a day. (Patient not taking: Reported on 1/10/2024) 60 Tablet     [] gabapentin (NEURONTIN) 100 MG Cap Take 1 Capsule by mouth 3 times a day for 30 days. 90 Capsule 0     No facility-administered encounter medications on file as of 2024.     Social History     Socioeconomic History    Marital status:      Spouse name: Not on file    Number of children: Not on file    Years of education: Not on file     "Highest education level: Not on file   Occupational History    Not on file   Tobacco Use    Smoking status: Never     Passive exposure: Never    Smokeless tobacco: Never   Vaping Use    Vaping Use: Never used   Substance and Sexual Activity    Alcohol use: Not Currently     Comment: occasionally    Drug use: Never    Sexual activity: Not on file   Other Topics Concern    Not on file   Social History Narrative    Not on file     Social Determinants of Health     Financial Resource Strain: Not on file   Food Insecurity: Not on file   Transportation Needs: Not on file   Physical Activity: Not on file   Stress: Not on file   Social Connections: Not on file   Intimate Partner Violence: Not on file   Housing Stability: Not on file      Social History     Tobacco Use   Smoking Status Never    Passive exposure: Never   Smokeless Tobacco Never     Social History     Substance and Sexual Activity   Alcohol Use Not Currently    Comment: occasionally     Social History     Substance and Sexual Activity   Drug Use Never      History reviewed. No pertinent family history.         Objective:   /76 (BP Location: Right arm, Patient Position: Sitting, BP Cuff Size: Large adult)   Pulse 100   Temp 37.2 °C (99 °F) (Temporal)   Ht 1.651 m (5' 5\")   Wt 83.9 kg (185 lb)   SpO2 99%   BMI 30.79 kg/m²     Physical Exam  Eyes:      General: No scleral icterus.  Abdominal:      Comments: Obese, soft without tenderness         Labs   Latest Reference Range & Units 01/10/24 15:38   WBC 4.8 - 10.8 K/uL 8.0   RBC 4.70 - 6.10 M/uL 4.04 (L)   Hemoglobin 14.0 - 18.0 g/dL 11.5 (L)   Hematocrit 42.0 - 52.0 % 35.4 (L)   MCV 81.4 - 97.8 fL 87.6   MCH 27.0 - 33.0 pg 28.5   MCHC 32.3 - 36.5 g/dL 32.5   RDW 35.9 - 50.0 fL 46.7   Platelet Count 164 - 446 K/uL 517 (H)   MPV 9.0 - 12.9 fL 10.1   (L): Data is abnormally low  (H): Data is abnormally high      Latest Reference Range & Units 02/16/24 14:20   Sodium 135 - 145 mmol/L 140   Potassium 3.6 " - 5.5 mmol/L 3.9   Chloride 96 - 112 mmol/L 102   Co2 20 - 33 mmol/L 25   Anion Gap 7.0 - 16.0  13.0   Glucose 65 - 99 mg/dL 98   Bun 8 - 22 mg/dL 10   Creatinine 0.50 - 1.40 mg/dL 0.52   GFR (CKD-EPI) >60 mL/min/1.73 m 2 121   Calcium 8.5 - 10.5 mg/dL 9.8   Correct Calcium 8.5 - 10.5 mg/dL 9.7   AST(SGOT) 12 - 45 U/L 25   ALT(SGPT) 2 - 50 U/L 28   Alkaline Phosphatase 30 - 99 U/L 94   Total Bilirubin 0.1 - 1.5 mg/dL 0.4   Albumin 3.2 - 4.9 g/dL 4.1   Total Protein 6.0 - 8.2 g/dL 7.9   Globulin 1.9 - 3.5 g/dL 3.8 (H)   A-G Ratio g/dL 1.1   (H): Data is abnormally high     Imaging  N/A     Pathology  SURGICAL SPECIMEN (12/27/23)  FINAL DIAGNOSIS:     A. Gallbladder:          Acute on chronic necrotizing cholecystitis.          Cholelithiasis.          Negative for dysplasia and malignancy.      Procedures  Laparoscopic to open cholecystectomy - Dr Queen  Hepaticojejunostomy - Dr Olivo    Diagnosis:     1. Cholangitis        2. Acute biliary pancreatitis without infection or necrosis        3. Elevated LFTs        4. Injury of bile duct, subsequent encounter  Comp Metabolic Panel          Medical Decision Making:  Today's Assessment / Status / Plan:     Overall the patient is doing quite well.  He is released back to full activity.  I will recheck a set of LFTs in 6 months time.  He does not have a primary care doctor so we will bring him back here to do that.    I will see him sooner if necessary.    Greater than 30 minutes were spent with the patient.  This included review of outside records and imaging, counseling of the patient and coordination of care.    I, Dr Olivo, have entered, reviewed and confirmed the above diagnoses related to this patient on this date of service, as per the time and date noted at top of this note.

## 2024-01-29 DIAGNOSIS — S36.13XD INJURY OF BILE DUCT, SUBSEQUENT ENCOUNTER: ICD-10-CM

## 2024-01-29 RX ORDER — TRAMADOL HYDROCHLORIDE 50 MG/1
50-100 TABLET ORAL EVERY 6 HOURS PRN
Qty: 30 TABLET | Refills: 0 | Status: SHIPPED | OUTPATIENT
Start: 2024-01-29 | End: 2024-02-08

## 2024-01-29 NOTE — PROGRESS NOTES
Updated pharmacy   [Initial Evaluation] : an initial evaluation [Spouse] : spouse [Pre-Visit Preparation] : pre-visit preparation was done [FreeTextEntry2] : chart reviewed

## 2024-02-16 ENCOUNTER — HOSPITAL ENCOUNTER (OUTPATIENT)
Dept: LAB | Facility: MEDICAL CENTER | Age: 54
End: 2024-02-16
Attending: NURSE PRACTITIONER
Payer: MEDICAID

## 2024-02-16 DIAGNOSIS — K85.10 ACUTE BILIARY PANCREATITIS WITHOUT INFECTION OR NECROSIS: ICD-10-CM

## 2024-02-16 DIAGNOSIS — S36.13XD INJURY OF BILE DUCT, SUBSEQUENT ENCOUNTER: ICD-10-CM

## 2024-02-16 DIAGNOSIS — R79.89 ELEVATED LFTS: ICD-10-CM

## 2024-02-16 DIAGNOSIS — K83.09 CHOLANGITIS: ICD-10-CM

## 2024-02-16 LAB
ALBUMIN SERPL BCP-MCNC: 4.1 G/DL (ref 3.2–4.9)
ALBUMIN/GLOB SERPL: 1.1 G/DL
ALP SERPL-CCNC: 94 U/L (ref 30–99)
ALT SERPL-CCNC: 28 U/L (ref 2–50)
ANION GAP SERPL CALC-SCNC: 13 MMOL/L (ref 7–16)
AST SERPL-CCNC: 25 U/L (ref 12–45)
BILIRUB SERPL-MCNC: 0.4 MG/DL (ref 0.1–1.5)
BUN SERPL-MCNC: 10 MG/DL (ref 8–22)
CALCIUM ALBUM COR SERPL-MCNC: 9.7 MG/DL (ref 8.5–10.5)
CALCIUM SERPL-MCNC: 9.8 MG/DL (ref 8.5–10.5)
CHLORIDE SERPL-SCNC: 102 MMOL/L (ref 96–112)
CO2 SERPL-SCNC: 25 MMOL/L (ref 20–33)
CREAT SERPL-MCNC: 0.52 MG/DL (ref 0.5–1.4)
GFR SERPLBLD CREATININE-BSD FMLA CKD-EPI: 121 ML/MIN/1.73 M 2
GLOBULIN SER CALC-MCNC: 3.8 G/DL (ref 1.9–3.5)
GLUCOSE SERPL-MCNC: 98 MG/DL (ref 65–99)
POTASSIUM SERPL-SCNC: 3.9 MMOL/L (ref 3.6–5.5)
PROT SERPL-MCNC: 7.9 G/DL (ref 6–8.2)
SODIUM SERPL-SCNC: 140 MMOL/L (ref 135–145)

## 2024-02-16 PROCEDURE — 80053 COMPREHEN METABOLIC PANEL: CPT

## 2024-02-16 PROCEDURE — 36415 COLL VENOUS BLD VENIPUNCTURE: CPT

## 2024-02-21 ENCOUNTER — OFFICE VISIT (OUTPATIENT)
Dept: SURGICAL ONCOLOGY | Facility: MEDICAL CENTER | Age: 54
End: 2024-02-21
Payer: MEDICAID

## 2024-02-21 VITALS
OXYGEN SATURATION: 99 % | HEIGHT: 65 IN | TEMPERATURE: 99 F | HEART RATE: 100 BPM | SYSTOLIC BLOOD PRESSURE: 122 MMHG | DIASTOLIC BLOOD PRESSURE: 76 MMHG | WEIGHT: 185 LBS | BODY MASS INDEX: 30.82 KG/M2

## 2024-02-21 DIAGNOSIS — K83.09 CHOLANGITIS: ICD-10-CM

## 2024-02-21 DIAGNOSIS — K85.10 ACUTE BILIARY PANCREATITIS WITHOUT INFECTION OR NECROSIS: ICD-10-CM

## 2024-02-21 DIAGNOSIS — S36.13XD INJURY OF BILE DUCT, SUBSEQUENT ENCOUNTER: ICD-10-CM

## 2024-02-21 DIAGNOSIS — R79.89 ELEVATED LFTS: ICD-10-CM

## 2024-02-21 PROCEDURE — 3074F SYST BP LT 130 MM HG: CPT | Performed by: SURGERY

## 2024-02-21 PROCEDURE — 3078F DIAST BP <80 MM HG: CPT | Performed by: SURGERY

## 2024-02-21 PROCEDURE — 99024 POSTOP FOLLOW-UP VISIT: CPT | Performed by: SURGERY

## 2024-02-21 ASSESSMENT — FIBROSIS 4 INDEX: FIB4 SCORE: 0.48

## 2024-07-15 NOTE — PROGRESS NOTES
Subjective:   8/21/2024  1:25 PM  Primary care physician: Pcp Pt States None  Referring Provider: Dr Meyers     Chief Complaint:   Chief Complaint   Patient presents with    Follow-Up     CBD INJURY  HEPATICOJEJUN  6 MO F/U   CMP 8/15 WNL        Diagnosis:   1. Acute biliary pancreatitis without infection or necrosis        2. Cholangitis        3. Elevated LFTs        4. Sepsis with acute liver failure without hepatic coma or septic shock, due to unspecified organism (HCC)        5. Injury of bile duct, subsequent encounter  Comp Metabolic Panel        History of presenting illness:    Sharath Gupta is a pleasant 53 y.o. male who presented with obstructive jaundice.  He apparently was transferred from [facility].  He is currently postoperative ERCP.  He was found to have choledocholithiasis without evidence described of cholangitis. His bilirubin was elevated along with his liver function test. His white count is 27,000.  Patient is unable to provide any historical information. Dr Queen performed an laparoscopic to open cholecystectomy, involving bile duct injury. Dr Olivo called to assist urgently in OR, and provided hepaticojejunostomy repair. Patient eventually discharged home on 12/30/23 with LILIAN drain and staples in place.     Update 1/10/24  He is here today for further evaluation. He is overall feeling better.  He still has some incisional pain. His drain is only been putting out 20 cc of serosanguineous fluid a day. No bile for 4 days. Denies fever shakes or chills.     Update 1/24/24  He is here today for 1 month follow-up. He is feeling well and offers no complaints. Still has some mild incisional pain.     Update 2/21/24  CMP on 2/16/24 was unremarkable. He is here today for follow-up. He has some vague incisional pain otherwise is doing well. Liver function tests have normalized.    Update 8/21/24  CMP on 8/15/24 was  unremarkable.    He is here today for further evaluation.  He has some occasional upper abdominal pain but otherwise feels quite well.  He is back at work.    No past medical history on file.  Past Surgical History:   Procedure Laterality Date    JUAN DIEGO BY LAPAROSCOPY N/A 12/27/2023    Procedure: CHOLECYSTECTOMY, LAPAROSCOPIC CONVERTED TO OPEN;  Surgeon: Derrick Meyers M.D.;  Location: [facility];  Service: General    HEPATICOJEJUNOSTOMY, ALIX-EN-Y N/A 12/27/2023    Procedure: HEPATICOJEJUNOSTOMY, ALIX-EN-Y;  Surgeon: Derrick Meyers M.D.;  Location: [facility];  Service: General    NM ERCP,DIAGNOSTIC N/A 12/26/2023    Procedure: ERCP (ENDOSCOPIC RETROGRADE CHOLANGIOPANCREATOGRAPHY);  Surgeon: Darwin Puente M.D.;  Location: SURGERY SAME DAY [facility];  Service: Gastroenterology    NM ERCP,W/REMOVAL STONE,VAISHALI/PANCR DUCTS  12/26/2023    Procedure: ERCP, WITH CALCULUS REMOVAL FROM BILE OR PANCREATIC DUCT;  Surgeon: Darwin Puente M.D.;  Location: SURGERY SAME DAY [facility];  Service: Gastroenterology    SPHINCTEROTOMY N/A 12/26/2023    Procedure: SPHINCTEROTOMY;  Surgeon: Darwin Puente M.D.;  Location: SURGERY SAME DAY [facility];  Service: Gastroenterology    BILIARY DILATATION N/A 12/26/2023    Procedure: DILATION, STRICTURE, BILE DUCT;  Surgeon: Darwin Puente M.D.;  Location: SURGERY SAME DAY [facility];  Service: Gastroenterology    BILIARY STENT PLACEMENT N/A 12/26/2023    Procedure: INSERTION, STENT, BILE DUCT;  Surgeon: Darwin Puente M.D.;  Location: SURGERY SAME DAY [facility];  Service: Gastroenterology     No Known Allergies  Outpatient Encounter Medications as of 8/21/2024   Medication Sig Dispense Refill    famotidine (PEPCID) 20 MG Tab Take 1 Tablet by mouth 2 times a day. (Patient not taking: Reported on 1/10/2024) 60 Tablet      No facility-administered encounter medications on file as of 8/21/2024.     Social History     Socioeconomic History    Marital status:      Spouse name:  "Not on file    Number of children: Not on file    Years of education: Not on file    Highest education level: Not on file   Occupational History    Not on file   Tobacco Use    Smoking status: Never     Passive exposure: Never    Smokeless tobacco: Never   Vaping Use    Vaping status: Never Used   Substance and Sexual Activity    Alcohol use: Not Currently     Comment: occasionally    Drug use: Never    Sexual activity: Not on file   Other Topics Concern    Not on file   Social History Narrative    Not on file     Social Determinants of Health     Financial Resource Strain: Not on file   Food Insecurity: Not on file   Transportation Needs: Not on file   Physical Activity: Not on file   Stress: Not on file   Social Connections: Not on file   Intimate Partner Violence: Not on file   Housing Stability: Not on file      Social History     Tobacco Use   Smoking Status Never    Passive exposure: Never   Smokeless Tobacco Never     Social History     Substance and Sexual Activity   Alcohol Use Not Currently    Comment: occasionally     Social History     Substance and Sexual Activity   Drug Use Never      No family history on file.    Review of Systems   Constitutional: Negative.    HENT: Negative.     Respiratory: Negative.     Cardiovascular: Negative.    Gastrointestinal:  Positive for abdominal pain. Negative for blood in stool, constipation, diarrhea and nausea.   Skin: Negative.         Objective:   /76 (BP Location: Right arm, Patient Position: Sitting, BP Cuff Size: Adult)   Pulse 83   Temp 36.9 °C (98.5 °F) (Temporal)   Ht 1.651 m (5' 5\")   Wt 90.3 kg (199 lb)   SpO2 97%   BMI 33.12 kg/m²     Physical Exam  Constitutional:       General: He is not in acute distress.     Appearance: He is obese. He is not ill-appearing.   HENT:      Head: Normocephalic and atraumatic.   Eyes:      General: No scleral icterus.  Abdominal:      Palpations: Abdomen is soft.      Tenderness: There is no abdominal " tenderness.      Hernia: No hernia is present.   Neurological:      Mental Status: He is alert.         Labs    Latest Reference Range & Units 01/10/24 15:38   WBC 4.8 - 10.8 K/uL 8.0   RBC 4.70 - 6.10 M/uL 4.04 (L)   Hemoglobin 14.0 - 18.0 g/dL 11.5 (L)   Hematocrit 42.0 - 52.0 % 35.4 (L)   MCV 81.4 - 97.8 fL 87.6   MCH 27.0 - 33.0 pg 28.5   MCHC 32.3 - 36.5 g/dL 32.5   RDW 35.9 - 50.0 fL 46.7   Platelet Count 164 - 446 K/uL 517 (H)   MPV 9.0 - 12.9 fL 10.1   (L): Data is abnormally low  (H): Data is abnormally high      Latest Reference Range & Units 08/15/24 13:20   Sodium 135 - 145 mmol/L 142   Potassium 3.6 - 5.5 mmol/L 3.9   Chloride 96 - 112 mmol/L 103   Co2 20 - 33 mmol/L 24   Anion Gap 7.0 - 16.0  15.0   Glucose 65 - 99 mg/dL 107 (H)   Bun 8 - 22 mg/dL 13   Creatinine 0.50 - 1.40 mg/dL 0.69   GFR (CKD-EPI) >60 mL/min/1.73 m 2 111   Calcium 8.5 - 10.5 mg/dL 8.9   Correct Calcium 8.5 - 10.5 mg/dL 8.8   AST(SGOT) 12 - 45 U/L 22   ALT(SGPT) 2 - 50 U/L 26   Alkaline Phosphatase 30 - 99 U/L 108 (H)   Total Bilirubin 0.1 - 1.5 mg/dL 0.4   Albumin 3.2 - 4.9 g/dL 4.1   Total Protein 6.0 - 8.2 g/dL 7.5   Globulin 1.9 - 3.5 g/dL 3.4   A-G Ratio g/dL 1.2   (H): Data is abnormally high     Imaging  N/A     Pathology  SURGICAL SPECIMEN (12/27/23)  FINAL DIAGNOSIS:   A. Gallbladder:          Acute on chronic necrotizing cholecystitis.          Cholelithiasis.          Negative for dysplasia and malignancy.      Procedures  Laparoscopic to open cholecystectomy - Dr Queen    Hepaticojejunostomy - Dr Olivo     Diagnosis:     1. Acute biliary pancreatitis without infection or necrosis        2. Cholangitis        3. Elevated LFTs        4. Sepsis with acute liver failure without hepatic coma or septic shock, due to unspecified organism (HCC)        5. Injury of bile duct, subsequent encounter  Comp Metabolic Panel          Medical Decision Making:  Today's Assessment / Status / Plan:     The patient overall is doing  quite well status post repair of a bile duct injury.    Labs are completely normal.    I will see him back in 6 months time with labs prior.    All questions were answered.    Greater than 30 minutes were spent with the patient.  This included review of outside records and imaging, counseling of the patient and coordination of care.    I, Dr Saldana, have entered, reviewed and confirmed the above diagnoses related to this patient on this date of service, as per the time and date noted at top of this note.

## 2024-08-15 ENCOUNTER — HOSPITAL ENCOUNTER (OUTPATIENT)
Dept: LAB | Facility: MEDICAL CENTER | Age: 54
End: 2024-08-15
Attending: SURGERY

## 2024-08-15 DIAGNOSIS — S36.13XD INJURY OF BILE DUCT, SUBSEQUENT ENCOUNTER: ICD-10-CM

## 2024-08-15 LAB
ALBUMIN SERPL BCP-MCNC: 4.1 G/DL (ref 3.2–4.9)
ALBUMIN/GLOB SERPL: 1.2 G/DL
ALP SERPL-CCNC: 108 U/L (ref 30–99)
ALT SERPL-CCNC: 26 U/L (ref 2–50)
ANION GAP SERPL CALC-SCNC: 15 MMOL/L (ref 7–16)
AST SERPL-CCNC: 22 U/L (ref 12–45)
BILIRUB SERPL-MCNC: 0.4 MG/DL (ref 0.1–1.5)
BUN SERPL-MCNC: 13 MG/DL (ref 8–22)
CALCIUM ALBUM COR SERPL-MCNC: 8.8 MG/DL (ref 8.5–10.5)
CALCIUM SERPL-MCNC: 8.9 MG/DL (ref 8.5–10.5)
CHLORIDE SERPL-SCNC: 103 MMOL/L (ref 96–112)
CO2 SERPL-SCNC: 24 MMOL/L (ref 20–33)
CREAT SERPL-MCNC: 0.69 MG/DL (ref 0.5–1.4)
GFR SERPLBLD CREATININE-BSD FMLA CKD-EPI: 111 ML/MIN/1.73 M 2
GLOBULIN SER CALC-MCNC: 3.4 G/DL (ref 1.9–3.5)
GLUCOSE SERPL-MCNC: 107 MG/DL (ref 65–99)
POTASSIUM SERPL-SCNC: 3.9 MMOL/L (ref 3.6–5.5)
PROT SERPL-MCNC: 7.5 G/DL (ref 6–8.2)
SODIUM SERPL-SCNC: 142 MMOL/L (ref 135–145)

## 2024-08-15 PROCEDURE — 36415 COLL VENOUS BLD VENIPUNCTURE: CPT

## 2024-08-15 PROCEDURE — 80053 COMPREHEN METABOLIC PANEL: CPT

## 2024-08-21 ENCOUNTER — OFFICE VISIT (OUTPATIENT)
Dept: SURGICAL ONCOLOGY | Facility: MEDICAL CENTER | Age: 54
End: 2024-08-21

## 2024-08-21 VITALS
OXYGEN SATURATION: 97 % | TEMPERATURE: 98.5 F | HEIGHT: 65 IN | SYSTOLIC BLOOD PRESSURE: 122 MMHG | DIASTOLIC BLOOD PRESSURE: 76 MMHG | HEART RATE: 83 BPM | BODY MASS INDEX: 33.15 KG/M2 | WEIGHT: 199 LBS

## 2024-08-21 DIAGNOSIS — K72.00 SEPSIS WITH ACUTE LIVER FAILURE WITHOUT HEPATIC COMA OR SEPTIC SHOCK, DUE TO UNSPECIFIED ORGANISM (HCC): ICD-10-CM

## 2024-08-21 DIAGNOSIS — R65.20 SEPSIS WITH ACUTE LIVER FAILURE WITHOUT HEPATIC COMA OR SEPTIC SHOCK, DUE TO UNSPECIFIED ORGANISM (HCC): ICD-10-CM

## 2024-08-21 DIAGNOSIS — K83.09 CHOLANGITIS: ICD-10-CM

## 2024-08-21 DIAGNOSIS — S36.13XD INJURY OF BILE DUCT, SUBSEQUENT ENCOUNTER: ICD-10-CM

## 2024-08-21 DIAGNOSIS — K85.10 ACUTE BILIARY PANCREATITIS WITHOUT INFECTION OR NECROSIS: ICD-10-CM

## 2024-08-21 DIAGNOSIS — A41.9 SEPSIS WITH ACUTE LIVER FAILURE WITHOUT HEPATIC COMA OR SEPTIC SHOCK, DUE TO UNSPECIFIED ORGANISM (HCC): ICD-10-CM

## 2024-08-21 DIAGNOSIS — R79.89 ELEVATED LFTS: ICD-10-CM

## 2024-08-21 PROCEDURE — 99214 OFFICE O/P EST MOD 30 MIN: CPT | Performed by: SURGERY

## 2024-08-21 PROCEDURE — 3074F SYST BP LT 130 MM HG: CPT | Performed by: SURGERY

## 2024-08-21 PROCEDURE — 3078F DIAST BP <80 MM HG: CPT | Performed by: SURGERY

## 2024-08-21 ASSESSMENT — ENCOUNTER SYMPTOMS
DIARRHEA: 0
ABDOMINAL PAIN: 1
CARDIOVASCULAR NEGATIVE: 1
RESPIRATORY NEGATIVE: 1
CONSTIPATION: 0
BLOOD IN STOOL: 0
CONSTITUTIONAL NEGATIVE: 1
NAUSEA: 0

## 2024-08-21 ASSESSMENT — FIBROSIS 4 INDEX: FIB4 SCORE: 0.43

## 2025-02-19 ENCOUNTER — HOSPITAL ENCOUNTER (OUTPATIENT)
Dept: LAB | Facility: MEDICAL CENTER | Age: 55
End: 2025-02-19
Attending: SURGERY

## 2025-02-19 DIAGNOSIS — S36.13XD INJURY OF BILE DUCT, SUBSEQUENT ENCOUNTER: ICD-10-CM

## 2025-02-19 LAB
ALBUMIN SERPL BCP-MCNC: 4.2 G/DL (ref 3.2–4.9)
ALBUMIN/GLOB SERPL: 1.2 G/DL
ALP SERPL-CCNC: 100 U/L (ref 30–99)
ALT SERPL-CCNC: 18 U/L (ref 2–50)
ANION GAP SERPL CALC-SCNC: 12 MMOL/L (ref 7–16)
AST SERPL-CCNC: 23 U/L (ref 12–45)
BILIRUB SERPL-MCNC: 0.6 MG/DL (ref 0.1–1.5)
BUN SERPL-MCNC: 18 MG/DL (ref 8–22)
CALCIUM ALBUM COR SERPL-MCNC: 9.2 MG/DL (ref 8.5–10.5)
CALCIUM SERPL-MCNC: 9.4 MG/DL (ref 8.5–10.5)
CHLORIDE SERPL-SCNC: 105 MMOL/L (ref 96–112)
CO2 SERPL-SCNC: 25 MMOL/L (ref 20–33)
CREAT SERPL-MCNC: 0.79 MG/DL (ref 0.5–1.4)
FASTING STATUS PATIENT QL REPORTED: NORMAL
GFR SERPLBLD CREATININE-BSD FMLA CKD-EPI: 106 ML/MIN/1.73 M 2
GLOBULIN SER CALC-MCNC: 3.6 G/DL (ref 1.9–3.5)
GLUCOSE SERPL-MCNC: 82 MG/DL (ref 65–99)
POTASSIUM SERPL-SCNC: 3.9 MMOL/L (ref 3.6–5.5)
PROT SERPL-MCNC: 7.8 G/DL (ref 6–8.2)
SODIUM SERPL-SCNC: 142 MMOL/L (ref 135–145)

## 2025-02-19 PROCEDURE — 36415 COLL VENOUS BLD VENIPUNCTURE: CPT

## 2025-02-19 PROCEDURE — 80053 COMPREHEN METABOLIC PANEL: CPT

## 2025-02-20 NOTE — PROGRESS NOTES
Subjective:   2/26/2025  2:06 PM  Primary care physician: Pcp Pt States None  Referring Provider: Dr Meyers     Chief Complaint:   Chief Complaint   Patient presents with    Follow-Up     CBD INJURY  HEPATICOJEJUN  6 MO F/U   CMP 2/19 WNL        Diagnosis:   1. Acute biliary pancreatitis without infection or necrosis        2. Cholangitis        3. Elevated LFTs        4. Sepsis with acute liver failure without hepatic coma or septic shock, due to unspecified organism (HCC)        5. Injury of bile duct, subsequent encounter        6. Class 1 obesity due to excess calories without serious comorbidity with body mass index (BMI) of 31.0 to 31.9 in adult          History of presenting illness:    Sharath Gupta is a pleasant 53 y.o. male who presented with obstructive jaundice.  He apparently was transferred from [facility].  He is currently postoperative ERCP.  He was found to have choledocholithiasis without evidence described of cholangitis. His bilirubin was elevated along with his liver function test. His white count is 27,000.  Patient is unable to provide any historical information. Dr Queen performed an laparoscopic to open cholecystectomy, involving bile duct injury. Dr Olivo called to assist urgently in OR, and provided hepaticojejunostomy repair. Patient eventually discharged home on 12/30/23 with LILIAN drain and staples in place.     Update 1/10/24  He is here today for further evaluation. He is overall feeling better.  He still has some incisional pain. His drain is only been putting out 20 cc of serosanguineous fluid a day. No bile for 4 days. Denies fever shakes or chills.     Update 1/24/24  He is here today for 1 month follow-up. He is feeling well and offers no complaints. Still has some mild incisional pain.     Update 2/21/24  CMP on 2/16/24 was unremarkable. He is here today for follow-up. He has some vague incisional pain  otherwise is doing well. Liver function tests have normalized.     Update 8/21/24  CMP on 8/15/24 was unremarkable. He is here today for further evaluation. He has some occasional upper abdominal pain but otherwise feels quite well. He is back at work.    Update 2/25/25  CMP on 2/19/25 essentially unremarkable.    He is here today for further evaluation.  The patient has been having some epigastric pain over the last month.  He started some Pepcid a few days ago which has made him feel better.  Otherwise no complaints.    No past medical history on file.  Past Surgical History:   Procedure Laterality Date    JUAN DIEGO BY LAPAROSCOPY N/A 12/27/2023    Procedure: CHOLECYSTECTOMY, LAPAROSCOPIC CONVERTED TO OPEN;  Surgeon: Derrick Meyers M.D.;  Location: [facility];  Service: General    HEPATICOJEJUNOSTOMY, ALIX-EN-Y N/A 12/27/2023    Procedure: HEPATICOJEJUNOSTOMY, ALIX-EN-Y;  Surgeon: Derrick Meyers M.D.;  Location: [facility];  Service: General    VT ERCP,DIAGNOSTIC N/A 12/26/2023    Procedure: ERCP (ENDOSCOPIC RETROGRADE CHOLANGIOPANCREATOGRAPHY);  Surgeon: Darwin Puente M.D.;  Location: SURGERY [facility];  Service: Gastroenterology    VT ERCP,W/REMOVAL STONE,VAISHALI/PANCR DUCTS  12/26/2023    Procedure: ERCP, WITH CALCULUS REMOVAL FROM BILE OR PANCREATIC DUCT;  Surgeon: Darwin Puente M.D.;  Location: SURGERY SAME [facility];  Service: Gastroenterology    SPHINCTEROTOMY N/A 12/26/2023    Procedure: SPHINCTEROTOMY;  Surgeon: Darwin Puente M.D.;  Location: SURGERY SAME DAY [facility];  Service: Gastroenterology    BILIARY DILATATION N/A 12/26/2023    Procedure: DILATION, STRICTURE, BILE DUCT;  Surgeon: Darwin Puente M.D.;  Location: SURGERY SAME DAY [facility];  Service: Gastroenterology    BILIARY STENT PLACEMENT N/A 12/26/2023    Procedure: INSERTION, STENT, BILE DUCT;  Surgeon: Darwin Puente M.D.;  Location: SURGERY SAME DAY [facility];  Service: Gastroenterology     No Known  "Allergies  Outpatient Encounter Medications as of 2/26/2025   Medication Sig Dispense Refill    famotidine (PEPCID) 20 MG Tab Take 1 Tablet by mouth 2 times a day. (Patient not taking: Reported on 1/10/2024) 60 Tablet      No facility-administered encounter medications on file as of 2/26/2025.     Social History     Socioeconomic History    Marital status:      Spouse name: Not on file    Number of children: Not on file    Years of education: Not on file    Highest education level: Not on file   Occupational History    Not on file   Tobacco Use    Smoking status: Never     Passive exposure: Never    Smokeless tobacco: Never   Vaping Use    Vaping status: Never Used   Substance and Sexual Activity    Alcohol use: Not Currently     Comment: occasionally    Drug use: Never    Sexual activity: Not on file   Other Topics Concern    Not on file   Social History Narrative    Not on file     Social Drivers of Health     Financial Resource Strain: Not on file   Food Insecurity: Not on file   Transportation Needs: Not on file   Physical Activity: Not on file   Stress: Not on file   Social Connections: Not on file   Intimate Partner Violence: Not on file   Housing Stability: Not on file      Social History     Tobacco Use   Smoking Status Never    Passive exposure: Never   Smokeless Tobacco Never     Social History     Substance and Sexual Activity   Alcohol Use Not Currently    Comment: occasionally     Social History     Substance and Sexual Activity   Drug Use Never      No family history on file.    Review of Systems   Constitutional: Negative.    HENT: Negative.     Eyes: Negative.    Respiratory: Negative.     Cardiovascular: Negative.    Gastrointestinal:  Positive for abdominal pain and heartburn.   Skin: Negative.         Objective:   /82 (BP Location: Left arm, Patient Position: Sitting, BP Cuff Size: Adult)   Pulse 84   Temp 36.1 °C (97 °F) (Temporal)   Ht 1.626 m (5' 4\")   Wt 90 kg (198 lb 6.6 oz)  "  SpO2 98%   BMI 34.06 kg/m²     Physical Exam  Constitutional:       General: He is not in acute distress.     Appearance: Normal appearance. He is obese. He is not ill-appearing, toxic-appearing or diaphoretic.   HENT:      Head: Normocephalic and atraumatic.   Eyes:      General: No scleral icterus.     Extraocular Movements: Extraocular movements intact.      Conjunctiva/sclera: Conjunctivae normal.      Pupils: Pupils are equal, round, and reactive to light.   Cardiovascular:      Rate and Rhythm: Normal rate and regular rhythm.   Pulmonary:      Effort: Pulmonary effort is normal.      Breath sounds: Normal breath sounds.   Abdominal:      Palpations: Abdomen is soft.      Tenderness: There is no abdominal tenderness.   Neurological:      Mental Status: He is alert.         Labs    Latest Reference Range & Units 01/10/24 15:38   WBC 4.8 - 10.8 K/uL 8.0   RBC 4.70 - 6.10 M/uL 4.04 (L)   Hemoglobin 14.0 - 18.0 g/dL 11.5 (L)   Hematocrit 42.0 - 52.0 % 35.4 (L)   MCV 81.4 - 97.8 fL 87.6   MCH 27.0 - 33.0 pg 28.5   MCHC 32.3 - 36.5 g/dL 32.5   RDW 35.9 - 50.0 fL 46.7   Platelet Count 164 - 446 K/uL 517 (H)   MPV 9.0 - 12.9 fL 10.1   (L): Data is abnormally low  (H): Data is abnormally high      Latest Reference Range & Units 02/19/25 10:04   Sodium 135 - 145 mmol/L 142   Potassium 3.6 - 5.5 mmol/L 3.9   Chloride 96 - 112 mmol/L 105   Co2 20 - 33 mmol/L 25   Anion Gap 7.0 - 16.0  12.0   Glucose 65 - 99 mg/dL 82   Bun 8 - 22 mg/dL 18   Creatinine 0.50 - 1.40 mg/dL 0.79   GFR (CKD-EPI) >60 mL/min/1.73 m 2 106   Calcium 8.5 - 10.5 mg/dL 9.4   Correct Calcium 8.5 - 10.5 mg/dL 9.2   AST(SGOT) 12 - 45 U/L 23   ALT(SGPT) 2 - 50 U/L 18   Alkaline Phosphatase 30 - 99 U/L 100 (H)   Total Bilirubin 0.1 - 1.5 mg/dL 0.6   Albumin 3.2 - 4.9 g/dL 4.2   Total Protein 6.0 - 8.2 g/dL 7.8   Globulin 1.9 - 3.5 g/dL 3.6 (H)   A-G Ratio g/dL 1.2   (H): Data is abnormally high     Imaging  N/A     Pathology  SURGICAL SPECIMEN  (12/27/23)  FINAL DIAGNOSIS:   A. Gallbladder:          Acute on chronic necrotizing cholecystitis.          Cholelithiasis.          Negative for dysplasia and malignancy.      Procedures  Laparoscopic to open cholecystectomy - Dr Queen     Hepaticojejunostomy - Dr Olivo    Diagnosis:     1. Acute biliary pancreatitis without infection or necrosis        2. Cholangitis        3. Elevated LFTs        4. Sepsis with acute liver failure without hepatic coma or septic shock, due to unspecified organism (HCC)        5. Injury of bile duct, subsequent encounter        6. Class 1 obesity due to excess calories without serious comorbidity with body mass index (BMI) of 31.0 to 31.9 in adult            Medical Decision Making:  Today's Assessment / Status / Plan:     Is doing well 14 months out from hepaticojejunostomy for common bile duct injury.  LFTs are essentially normal with a very slightly elevated alkaline phosphatase.    Regarding that I would like to see him back in 6 months with a CMP prior.    The patient currently is on Pepcid and is feeling better from a heartburn and epigastric pain perspective.  I did tell him that should he continue to have pain to please call and we will set him up for an endoscopy with one of my partners.  He expresses understanding of this.    Greater than 45 minutes were spent with the patient.  This included review of outside records and imaging, counseling of the patient and coordination of care.    I, Dr Olivo, have entered, reviewed and confirmed the above diagnoses related to this patient on this date of service, as per the time and date noted at top of this note.

## 2025-02-26 ENCOUNTER — OFFICE VISIT (OUTPATIENT)
Facility: MEDICAL CENTER | Age: 55
End: 2025-02-26

## 2025-02-26 VITALS
SYSTOLIC BLOOD PRESSURE: 124 MMHG | DIASTOLIC BLOOD PRESSURE: 82 MMHG | OXYGEN SATURATION: 98 % | HEIGHT: 64 IN | WEIGHT: 198.41 LBS | BODY MASS INDEX: 33.87 KG/M2 | TEMPERATURE: 97 F | HEART RATE: 84 BPM

## 2025-02-26 DIAGNOSIS — K72.00 SEPSIS WITH ACUTE LIVER FAILURE WITHOUT HEPATIC COMA OR SEPTIC SHOCK, DUE TO UNSPECIFIED ORGANISM (HCC): ICD-10-CM

## 2025-02-26 DIAGNOSIS — S36.13XD INJURY OF BILE DUCT, SUBSEQUENT ENCOUNTER: ICD-10-CM

## 2025-02-26 DIAGNOSIS — A41.9 SEPSIS WITH ACUTE LIVER FAILURE WITHOUT HEPATIC COMA OR SEPTIC SHOCK, DUE TO UNSPECIFIED ORGANISM (HCC): ICD-10-CM

## 2025-02-26 DIAGNOSIS — E66.09 CLASS 1 OBESITY DUE TO EXCESS CALORIES WITHOUT SERIOUS COMORBIDITY WITH BODY MASS INDEX (BMI) OF 31.0 TO 31.9 IN ADULT: ICD-10-CM

## 2025-02-26 DIAGNOSIS — K85.10 ACUTE BILIARY PANCREATITIS WITHOUT INFECTION OR NECROSIS: ICD-10-CM

## 2025-02-26 DIAGNOSIS — K83.09 CHOLANGITIS: ICD-10-CM

## 2025-02-26 DIAGNOSIS — E66.811 CLASS 1 OBESITY DUE TO EXCESS CALORIES WITHOUT SERIOUS COMORBIDITY WITH BODY MASS INDEX (BMI) OF 31.0 TO 31.9 IN ADULT: ICD-10-CM

## 2025-02-26 DIAGNOSIS — R79.89 ELEVATED LFTS: ICD-10-CM

## 2025-02-26 DIAGNOSIS — R65.20 SEPSIS WITH ACUTE LIVER FAILURE WITHOUT HEPATIC COMA OR SEPTIC SHOCK, DUE TO UNSPECIFIED ORGANISM (HCC): ICD-10-CM

## 2025-02-26 ASSESSMENT — ENCOUNTER SYMPTOMS
CONSTITUTIONAL NEGATIVE: 1
RESPIRATORY NEGATIVE: 1
EYES NEGATIVE: 1
ABDOMINAL PAIN: 1
HEARTBURN: 1
CARDIOVASCULAR NEGATIVE: 1

## 2025-02-26 ASSESSMENT — FIBROSIS 4 INDEX: FIB4 SCORE: 0.56

## (undated) DEVICE — GLOVE BIOGEL PI ORTHO SZ 8 PF LF (40PR/BX)

## (undated) DEVICE — ELECTRODE 5MM LHK LAPSCP STERILE DISP- MEGADYNE  (5/CA)

## (undated) DEVICE — JAGTOME RX 39 PRE-LOADED .025 X 260CM

## (undated) DEVICE — NEPTUNE 4 PORT MANIFOLD - (20/PK)

## (undated) DEVICE — SET SUCTION/IRRIGATION WITH DISPOSABLE TIP (6/CA )PART #0250-070-520 IS A SUB

## (undated) DEVICE — PORT AUXILLARY WATER (50EA/BX)

## (undated) DEVICE — SET LEADWIRE 5 LEAD BEDSIDE DISPOSABLE ECG (1SET OF 5/EA)

## (undated) DEVICE — TROCAR Z THREAD11MM OPTICAL - NON BLADED(6/BX)

## (undated) DEVICE — SUTURE 3-0 27IN PDS PLUS CLR - SH (36/BX)

## (undated) DEVICE — SUCTION TIP STRAIGHT ARGYLE - 50EA/CA

## (undated) DEVICE — SUTURE 3-0 SILK 12 X 18 IN - (36/BX)

## (undated) DEVICE — SUTURE 4-0 PDS II RB-1 (36EA/BX)

## (undated) DEVICE — GLOVE SZ 6.5 BIOGEL PI MICRO - PF LF (50PR/BX)

## (undated) DEVICE — SUTURE 3-0 VICRYL PLUS SH - 8X 18 INCH (12/BX)

## (undated) DEVICE — BITE BLOCK, DISP.

## (undated) DEVICE — GLOVE SZ 7.5 BIOGEL PI MICRO - PF LF (50PR/BX)

## (undated) DEVICE — KIT CUSTOM PROCEDURE SINGLE FOR ENDO  (15/CA)

## (undated) DEVICE — TOWEL STOP TIMEOUT SAFETY FLAG (40EA/CA)

## (undated) DEVICE — PAD LAP STERILE 18 X 18 - (5/PK 40PK/CA)

## (undated) DEVICE — SET EXTENSION WITH 2 PORTS (48EA/CA) ***PART #2C8610 IS A SUBSTITUTE*****

## (undated) DEVICE — SENSOR OXIMETER ADULT SPO2 RD SET (20EA/BX)

## (undated) DEVICE — BLADE ELECTRODE EDGE INSULATED 4 IN (50EA/BX)

## (undated) DEVICE — RELOAD WITH GRIPPING SURFACE TECHNOLOGY BLUE 60MM (12EA/BX)

## (undated) DEVICE — GLOVE SZ 7 BIOGEL PI MICRO - PF LF (50PR/BX 4BX/CA)

## (undated) DEVICE — SUCTION TUBE FRAZIER 12FR STERILE (40EA/CA)

## (undated) DEVICE — SUTURE 1 STRATAFIX SYMMETRIC PDS CTX 45CM (12EA/BX)

## (undated) DEVICE — SUTURE ABSORBABLE PDS PLUS 4-0 SH L27 IN ANTIBACTERIAL VIOLET

## (undated) DEVICE — INTRODUCER STENT NAVIFLEX 10FR

## (undated) DEVICE — CANISTER SUCTION 3000ML MECHANICAL FILTER AUTO SHUTOFF MEDI-VAC NONSTERILE LF DISP  (40EA/CA)

## (undated) DEVICE — CANNULA W/SEAL 5X100 Z-THRE - ADED KII (12/BX)

## (undated) DEVICE — CLIP SM INTNL HRZN TI ESCP LGT - (24EA/PK 25PK/BX)

## (undated) DEVICE — TUBE CONNECTING SUCTION - CLEAR PLASTIC STERILE 72 IN (50EA/CA)

## (undated) DEVICE — GLOVE BIOGEL INDICATOR SZ 6.5 SURGICAL PF LTX - (50PR/BX 4BX/CA)

## (undated) DEVICE — CANNULA O2 COMFORT SOFT EAR ADULT 7 FT TUBING (50/CA)

## (undated) DEVICE — SET TUBING PNEUMOCLEAR HIGH FLOW SMOKE EVACUATION (10EA/BX)

## (undated) DEVICE — SUTURE 4-0 MONOCRYL PLUS PS-2 - 27 INCH (36/BX)

## (undated) DEVICE — SUTURE 3-0 SILK SH C/R 18 IN - (12/BX)

## (undated) DEVICE — TOWELS CLOTH SURGICAL - (4/PK 20PK/CA)

## (undated) DEVICE — SUTURE GENERAL

## (undated) DEVICE — Device

## (undated) DEVICE — TUBE E-T HI-LO CUFF 7.5MM (10EA/PK)

## (undated) DEVICE — TROCAR 5X100 NON BLADED Z-TH - READ KII (6/BX)

## (undated) DEVICE — SODIUM CHL IRRIGATION 0.9% 1000ML (12EA/CA)

## (undated) DEVICE — SUTURE 3-0 SILK RB-1 C/R (12/BX)

## (undated) DEVICE — ELECTRODE DUAL RETURN W/ CORD - (50/PK)

## (undated) DEVICE — MEDICINE CUP STERILE 2 OZ - (100/CA)

## (undated) DEVICE — GLOVE BIOGEL PI INDICATOR SZ 7.0 SURGICAL PF LF - (50/BX 4BX/CA)

## (undated) DEVICE — RESERVOIR SUCTION 100 CC - SILICONE (20EA/CA)

## (undated) DEVICE — BUTTON ENDOSCOPY DISPOSABLE

## (undated) DEVICE — SUTURE 3-0 SILK SH (12PK/BX)

## (undated) DEVICE — DEVICE BIOPSY RX BILIARY SYSTEM CAP (10EA/BX)

## (undated) DEVICE — SUTURE 3-0 PDS II PLUS SH 27 IN (36EA/BX)

## (undated) DEVICE — NEEDLE INSFL 120MM 14GA VRRS - (20/BX)

## (undated) DEVICE — SUTURE 0 SILK TIES (36PK/BX)

## (undated) DEVICE — FILM CASSETTE ENDO

## (undated) DEVICE — LACTATED RINGERS INJ 1000 ML - (14EA/CA 60CA/PF)

## (undated) DEVICE — STAPLER ECHELON 3000 60MM STANDARD (3EA/BX)

## (undated) DEVICE — CANISTER SUCTION RIGID RED 1500CC (40EA/CA)

## (undated) DEVICE — MASK OXYGEN VNYL ADLT MED CONC WITH 7 FOOT TUBING  - (50EA/CA)

## (undated) DEVICE — GOWN WARMING STANDARD FLEX - (30/CA)

## (undated) DEVICE — COVER LIGHT HANDLE ALC PLUS DISP (18EA/BX)

## (undated) DEVICE — SLEEVE VASO CALF MED - (10PR/CA)

## (undated) DEVICE — SUTURE 0 VICRYL PLUS UR-6 - 27 INCH (36/BX)

## (undated) DEVICE — CATHETER REDDICK ----MUST ORDER IN MULITPLES OF 10----

## (undated) DEVICE — GOWN SURGEONS X-LARGE - DISP. (30/CA)

## (undated) DEVICE — SUTURE 2-0 ETHILON FS - (36/BX) 18 INCH

## (undated) DEVICE — ANTI-FOG SOLUTION - 60BTL/CA

## (undated) DEVICE — GLOVE BIOGEL PI INDICATOR SZ 7.5 SURGICAL PF LF -(50/BX 4BX/CA)

## (undated) DEVICE — SYRINGE 30 ML LL (56/BX)

## (undated) DEVICE — GLOVE BIOGEL SZ 7 SURGICAL PF LTX - (50PR/BX 4BX/CA)

## (undated) DEVICE — CLIP MED INTNL HRZN TI ESCP - (25/BX)

## (undated) DEVICE — SODIUM CHL. INJ. 0.9% 500ML (24EA/CA 50CA/PF)

## (undated) DEVICE — CLIP MED LG INTNL HRZN TI ESCP - (20/BX)

## (undated) DEVICE — SUTURE 2-0 SILK 12 X 18" (36PK/BX)"

## (undated) DEVICE — COVER ENDOSCOPE DISTAL SINGLE USE (20EA/BX)

## (undated) DEVICE — DRAPE LARGE 3 QUARTER - (20/CA)

## (undated) DEVICE — WATER IRRIGATION STERILE 1000ML (12EA/CA)

## (undated) DEVICE — KIT ANESTHESIA W/CIRCUIT & 3/LT BAG W/FILTER (20EA/CA)

## (undated) DEVICE — TUBING CLEARLINK DUO-VENT - C-FLO (48EA/CA)

## (undated) DEVICE — PACK LAP CHOLE OR - (2EA/CA)

## (undated) DEVICE — SUCTION INSTRUMENT YANKAUER BULBOUS TIP W/O VENT (50EA/CA)

## (undated) DEVICE — SYRINGE STERILE 10 ML LL (200/BX)

## (undated) DEVICE — AUTOCAP RX FOR EXALT SCOPES (10EA/BX)

## (undated) DEVICE — SYRINGE 30 ML LS (56/BX)